# Patient Record
Sex: FEMALE | Race: WHITE | NOT HISPANIC OR LATINO | Employment: UNEMPLOYED | URBAN - NONMETROPOLITAN AREA
[De-identification: names, ages, dates, MRNs, and addresses within clinical notes are randomized per-mention and may not be internally consistent; named-entity substitution may affect disease eponyms.]

---

## 2022-02-05 ENCOUNTER — APPOINTMENT (OUTPATIENT)
Dept: GENERAL RADIOLOGY | Facility: HOSPITAL | Age: 67
End: 2022-02-05

## 2022-02-05 ENCOUNTER — HOSPITAL ENCOUNTER (EMERGENCY)
Facility: HOSPITAL | Age: 67
Discharge: HOME | End: 2022-02-05
Attending: EMERGENCY MEDICINE | Admitting: EMERGENCY MEDICINE

## 2022-02-05 ENCOUNTER — APPOINTMENT (OUTPATIENT)
Dept: CT IMAGING | Facility: HOSPITAL | Age: 67
End: 2022-02-05

## 2022-02-05 VITALS
TEMPERATURE: 99 F | HEART RATE: 79 BPM | SYSTOLIC BLOOD PRESSURE: 158 MMHG | WEIGHT: 171.8 LBS | BODY MASS INDEX: 30.44 KG/M2 | RESPIRATION RATE: 18 BRPM | DIASTOLIC BLOOD PRESSURE: 74 MMHG | HEIGHT: 63 IN | OXYGEN SATURATION: 97 %

## 2022-02-05 DIAGNOSIS — N20.0 KIDNEY STONE: ICD-10-CM

## 2022-02-05 DIAGNOSIS — N30.01 ACUTE CYSTITIS WITH HEMATURIA: Primary | ICD-10-CM

## 2022-02-05 DIAGNOSIS — K82.8 GALLBLADDER SLUDGE: ICD-10-CM

## 2022-02-05 LAB
ALBUMIN SERPL-MCNC: 4.8 G/DL (ref 3.5–5.2)
ALBUMIN/GLOB SERPL: 1.5 G/DL
ALP SERPL-CCNC: 67 U/L (ref 39–117)
ALT SERPL W P-5'-P-CCNC: 19 U/L (ref 1–33)
ANION GAP SERPL CALCULATED.3IONS-SCNC: 12.6 MMOL/L (ref 5–15)
AST SERPL-CCNC: 23 U/L (ref 1–32)
BACTERIA UR QL AUTO: ABNORMAL /HPF
BASOPHILS # BLD AUTO: 0.12 10*3/MM3 (ref 0–0.2)
BASOPHILS NFR BLD AUTO: 1.4 % (ref 0–1.5)
BILIRUB SERPL-MCNC: 0.8 MG/DL (ref 0–1.2)
BILIRUB UR QL STRIP: NEGATIVE
BUN SERPL-MCNC: 12 MG/DL (ref 8–23)
BUN/CREAT SERPL: 12.2 (ref 7–25)
CALCIUM SPEC-SCNC: 9.6 MG/DL (ref 8.6–10.5)
CHLORIDE SERPL-SCNC: 99 MMOL/L (ref 98–107)
CLARITY UR: CLEAR
CO2 SERPL-SCNC: 25.4 MMOL/L (ref 22–29)
COLOR UR: YELLOW
CREAT SERPL-MCNC: 0.98 MG/DL (ref 0.57–1)
DEPRECATED RDW RBC AUTO: 39.9 FL (ref 37–54)
EOSINOPHIL # BLD AUTO: 0.16 10*3/MM3 (ref 0–0.4)
EOSINOPHIL NFR BLD AUTO: 1.8 % (ref 0.3–6.2)
ERYTHROCYTE [DISTWIDTH] IN BLOOD BY AUTOMATED COUNT: 12.9 % (ref 12.3–15.4)
GFR SERPL CREATININE-BSD FRML MDRD: 57 ML/MIN/1.73
GFR SERPL CREATININE-BSD FRML MDRD: 69 ML/MIN/1.73
GLOBULIN UR ELPH-MCNC: 3.1 GM/DL
GLUCOSE SERPL-MCNC: 105 MG/DL (ref 65–99)
GLUCOSE UR STRIP-MCNC: NEGATIVE MG/DL
HCT VFR BLD AUTO: 46.9 % (ref 34–46.6)
HGB BLD-MCNC: 15.5 G/DL (ref 12–15.9)
HGB UR QL STRIP.AUTO: ABNORMAL
HOLD SPECIMEN: NORMAL
HOLD SPECIMEN: NORMAL
HYALINE CASTS UR QL AUTO: ABNORMAL /LPF
IMM GRANULOCYTES # BLD AUTO: 0.02 10*3/MM3 (ref 0–0.05)
IMM GRANULOCYTES NFR BLD AUTO: 0.2 % (ref 0–0.5)
KETONES UR QL STRIP: ABNORMAL
LEUKOCYTE ESTERASE UR QL STRIP.AUTO: ABNORMAL
LIPASE SERPL-CCNC: 49 U/L (ref 13–60)
LYMPHOCYTES # BLD AUTO: 3.16 10*3/MM3 (ref 0.7–3.1)
LYMPHOCYTES NFR BLD AUTO: 35.6 % (ref 19.6–45.3)
MAGNESIUM SERPL-MCNC: 2.1 MG/DL (ref 1.6–2.4)
MCH RBC QN AUTO: 27.9 PG (ref 26.6–33)
MCHC RBC AUTO-ENTMCNC: 33 G/DL (ref 31.5–35.7)
MCV RBC AUTO: 84.5 FL (ref 79–97)
MONOCYTES # BLD AUTO: 0.58 10*3/MM3 (ref 0.1–0.9)
MONOCYTES NFR BLD AUTO: 6.5 % (ref 5–12)
NEUTROPHILS NFR BLD AUTO: 4.83 10*3/MM3 (ref 1.7–7)
NEUTROPHILS NFR BLD AUTO: 54.5 % (ref 42.7–76)
NITRITE UR QL STRIP: POSITIVE
NRBC BLD AUTO-RTO: 0 /100 WBC (ref 0–0.2)
NT-PROBNP SERPL-MCNC: 156.2 PG/ML (ref 0–900)
PH UR STRIP.AUTO: 6 [PH] (ref 5–8)
PLATELET # BLD AUTO: 352 10*3/MM3 (ref 140–450)
PMV BLD AUTO: 10.4 FL (ref 6–12)
POTASSIUM SERPL-SCNC: 4 MMOL/L (ref 3.5–5.2)
PROCALCITONIN SERPL-MCNC: 0.03 NG/ML (ref 0–0.25)
PROT SERPL-MCNC: 7.9 G/DL (ref 6–8.5)
PROT UR QL STRIP: ABNORMAL
RBC # BLD AUTO: 5.55 10*6/MM3 (ref 3.77–5.28)
RBC # UR STRIP: ABNORMAL /HPF
REF LAB TEST METHOD: ABNORMAL
SODIUM SERPL-SCNC: 137 MMOL/L (ref 136–145)
SP GR UR STRIP: 1.01 (ref 1–1.03)
SQUAMOUS #/AREA URNS HPF: ABNORMAL /HPF
TROPONIN T SERPL-MCNC: <0.01 NG/ML (ref 0–0.03)
UROBILINOGEN UR QL STRIP: ABNORMAL
WBC # UR STRIP: ABNORMAL /HPF
WBC NRBC COR # BLD: 8.87 10*3/MM3 (ref 3.4–10.8)
WHOLE BLOOD HOLD SPECIMEN: NORMAL
WHOLE BLOOD HOLD SPECIMEN: NORMAL

## 2022-02-05 PROCEDURE — 71045 X-RAY EXAM CHEST 1 VIEW: CPT

## 2022-02-05 PROCEDURE — 85025 COMPLETE CBC W/AUTO DIFF WBC: CPT | Performed by: EMERGENCY MEDICINE

## 2022-02-05 PROCEDURE — 25010000002 ONDANSETRON PER 1 MG: Performed by: NURSE PRACTITIONER

## 2022-02-05 PROCEDURE — 74176 CT ABD & PELVIS W/O CONTRAST: CPT

## 2022-02-05 PROCEDURE — 87077 CULTURE AEROBIC IDENTIFY: CPT | Performed by: NURSE PRACTITIONER

## 2022-02-05 PROCEDURE — 81001 URINALYSIS AUTO W/SCOPE: CPT | Performed by: NURSE PRACTITIONER

## 2022-02-05 PROCEDURE — 96374 THER/PROPH/DIAG INJ IV PUSH: CPT

## 2022-02-05 PROCEDURE — 99284 EMERGENCY DEPT VISIT MOD MDM: CPT

## 2022-02-05 PROCEDURE — 87186 SC STD MICRODIL/AGAR DIL: CPT | Performed by: NURSE PRACTITIONER

## 2022-02-05 PROCEDURE — 83880 ASSAY OF NATRIURETIC PEPTIDE: CPT | Performed by: NURSE PRACTITIONER

## 2022-02-05 PROCEDURE — 93005 ELECTROCARDIOGRAM TRACING: CPT | Performed by: EMERGENCY MEDICINE

## 2022-02-05 PROCEDURE — 83690 ASSAY OF LIPASE: CPT | Performed by: EMERGENCY MEDICINE

## 2022-02-05 PROCEDURE — 25010000002 DEXAMETHASONE SODIUM PHOSPHATE 10 MG/ML SOLUTION: Performed by: NURSE PRACTITIONER

## 2022-02-05 PROCEDURE — 83735 ASSAY OF MAGNESIUM: CPT | Performed by: NURSE PRACTITIONER

## 2022-02-05 PROCEDURE — 93005 ELECTROCARDIOGRAM TRACING: CPT

## 2022-02-05 PROCEDURE — 87086 URINE CULTURE/COLONY COUNT: CPT | Performed by: NURSE PRACTITIONER

## 2022-02-05 PROCEDURE — 84145 PROCALCITONIN (PCT): CPT | Performed by: NURSE PRACTITIONER

## 2022-02-05 PROCEDURE — 96375 TX/PRO/DX INJ NEW DRUG ADDON: CPT

## 2022-02-05 PROCEDURE — 93005 ELECTROCARDIOGRAM TRACING: CPT | Performed by: NURSE PRACTITIONER

## 2022-02-05 PROCEDURE — 25010000002 KETOROLAC TROMETHAMINE PER 15 MG: Performed by: NURSE PRACTITIONER

## 2022-02-05 PROCEDURE — 80053 COMPREHEN METABOLIC PANEL: CPT | Performed by: EMERGENCY MEDICINE

## 2022-02-05 PROCEDURE — 84484 ASSAY OF TROPONIN QUANT: CPT | Performed by: EMERGENCY MEDICINE

## 2022-02-05 RX ORDER — NITROFURANTOIN 25; 75 MG/1; MG/1
100 CAPSULE ORAL 2 TIMES DAILY
Qty: 20 CAPSULE | Refills: 0 | Status: SHIPPED | OUTPATIENT
Start: 2022-02-05 | End: 2022-03-15

## 2022-02-05 RX ORDER — DEXAMETHASONE SODIUM PHOSPHATE 10 MG/ML
10 INJECTION, SOLUTION INTRAMUSCULAR; INTRAVENOUS ONCE
Status: DISCONTINUED | OUTPATIENT
Start: 2022-02-05 | End: 2022-02-05 | Stop reason: HOSPADM

## 2022-02-05 RX ORDER — NITROFURANTOIN 25; 75 MG/1; MG/1
100 CAPSULE ORAL 2 TIMES DAILY
Qty: 20 CAPSULE | Refills: 0 | Status: SHIPPED | OUTPATIENT
Start: 2022-02-05 | End: 2022-02-05 | Stop reason: SDUPTHER

## 2022-02-05 RX ORDER — ONDANSETRON 4 MG/1
4 TABLET, ORALLY DISINTEGRATING ORAL EVERY 8 HOURS PRN
Qty: 12 TABLET | Refills: 0 | Status: SHIPPED | OUTPATIENT
Start: 2022-02-05 | End: 2022-02-05 | Stop reason: SDUPTHER

## 2022-02-05 RX ORDER — DEXAMETHASONE SODIUM PHOSPHATE 10 MG/ML
10 INJECTION, SOLUTION INTRAMUSCULAR; INTRAVENOUS ONCE
Status: COMPLETED | OUTPATIENT
Start: 2022-02-05 | End: 2022-02-05

## 2022-02-05 RX ORDER — FAMOTIDINE 10 MG/ML
20 INJECTION, SOLUTION INTRAVENOUS ONCE
Status: DISCONTINUED | OUTPATIENT
Start: 2022-02-05 | End: 2022-02-05

## 2022-02-05 RX ORDER — SODIUM CHLORIDE 0.9 % (FLUSH) 0.9 %
10 SYRINGE (ML) INJECTION AS NEEDED
Status: DISCONTINUED | OUTPATIENT
Start: 2022-02-05 | End: 2022-02-05 | Stop reason: HOSPADM

## 2022-02-05 RX ORDER — ONDANSETRON 2 MG/ML
4 INJECTION INTRAMUSCULAR; INTRAVENOUS ONCE
Status: COMPLETED | OUTPATIENT
Start: 2022-02-05 | End: 2022-02-05

## 2022-02-05 RX ORDER — HYDROCODONE BITARTRATE AND ACETAMINOPHEN 5; 325 MG/1; MG/1
1 TABLET ORAL EVERY 6 HOURS PRN
Qty: 10 TABLET | Refills: 0 | Status: SHIPPED | OUTPATIENT
Start: 2022-02-05 | End: 2022-03-16 | Stop reason: HOSPADM

## 2022-02-05 RX ORDER — ALBUTEROL SULFATE 90 UG/1
2 AEROSOL, METERED RESPIRATORY (INHALATION) ONCE
Status: DISCONTINUED | OUTPATIENT
Start: 2022-02-05 | End: 2022-02-05

## 2022-02-05 RX ORDER — ONDANSETRON 2 MG/ML
4 INJECTION INTRAMUSCULAR; INTRAVENOUS ONCE
Status: DISCONTINUED | OUTPATIENT
Start: 2022-02-05 | End: 2022-02-05

## 2022-02-05 RX ORDER — KETOROLAC TROMETHAMINE 30 MG/ML
15 INJECTION, SOLUTION INTRAMUSCULAR; INTRAVENOUS ONCE
Status: COMPLETED | OUTPATIENT
Start: 2022-02-05 | End: 2022-02-05

## 2022-02-05 RX ORDER — KETOROLAC TROMETHAMINE 30 MG/ML
15 INJECTION, SOLUTION INTRAMUSCULAR; INTRAVENOUS ONCE
Status: DISCONTINUED | OUTPATIENT
Start: 2022-02-05 | End: 2022-02-05 | Stop reason: HOSPADM

## 2022-02-05 RX ORDER — FAMOTIDINE 10 MG/ML
20 INJECTION, SOLUTION INTRAVENOUS ONCE
Status: DISCONTINUED | OUTPATIENT
Start: 2022-02-05 | End: 2022-02-05 | Stop reason: HOSPADM

## 2022-02-05 RX ORDER — HYDROCODONE BITARTRATE AND ACETAMINOPHEN 5; 325 MG/1; MG/1
1 TABLET ORAL EVERY 6 HOURS PRN
Qty: 10 TABLET | Refills: 0 | Status: SHIPPED | OUTPATIENT
Start: 2022-02-05 | End: 2022-02-05 | Stop reason: SDUPTHER

## 2022-02-05 RX ORDER — ONDANSETRON 2 MG/ML
4 INJECTION INTRAMUSCULAR; INTRAVENOUS ONCE
Status: DISCONTINUED | OUTPATIENT
Start: 2022-02-05 | End: 2022-02-05 | Stop reason: HOSPADM

## 2022-02-05 RX ORDER — FAMOTIDINE 10 MG/ML
20 INJECTION, SOLUTION INTRAVENOUS ONCE
Status: COMPLETED | OUTPATIENT
Start: 2022-02-05 | End: 2022-02-05

## 2022-02-05 RX ORDER — ONDANSETRON 4 MG/1
4 TABLET, ORALLY DISINTEGRATING ORAL EVERY 8 HOURS PRN
Qty: 12 TABLET | Refills: 0 | Status: SHIPPED | OUTPATIENT
Start: 2022-02-05 | End: 2022-06-24

## 2022-02-05 RX ADMIN — FAMOTIDINE 20 MG: 10 INJECTION INTRAVENOUS at 14:11

## 2022-02-05 RX ADMIN — DEXAMETHASONE SODIUM PHOSPHATE 10 MG: 10 INJECTION INTRAMUSCULAR; INTRAVENOUS at 14:10

## 2022-02-05 RX ADMIN — KETOROLAC TROMETHAMINE 15 MG: 30 INJECTION, SOLUTION INTRAMUSCULAR; INTRAVENOUS at 14:11

## 2022-02-05 RX ADMIN — ONDANSETRON 4 MG: 2 INJECTION INTRAMUSCULAR; INTRAVENOUS at 14:11

## 2022-02-05 RX ADMIN — SODIUM CHLORIDE 1000 ML: 9 INJECTION, SOLUTION INTRAVENOUS at 14:11

## 2022-02-07 LAB — BACTERIA SPEC AEROBE CULT: ABNORMAL

## 2022-03-10 ENCOUNTER — OFFICE VISIT (OUTPATIENT)
Dept: UROLOGY | Facility: CLINIC | Age: 67
End: 2022-03-10

## 2022-03-10 VITALS
WEIGHT: 171 LBS | OXYGEN SATURATION: 99 % | DIASTOLIC BLOOD PRESSURE: 88 MMHG | HEIGHT: 63 IN | SYSTOLIC BLOOD PRESSURE: 158 MMHG | BODY MASS INDEX: 30.3 KG/M2 | TEMPERATURE: 97 F | HEART RATE: 70 BPM

## 2022-03-10 DIAGNOSIS — N20.0 NEPHROLITHIASIS: Primary | ICD-10-CM

## 2022-03-10 LAB
BILIRUB BLD-MCNC: NEGATIVE MG/DL
CLARITY, POC: CLEAR
COLOR UR: YELLOW
EXPIRATION DATE: ABNORMAL
GLUCOSE UR STRIP-MCNC: NEGATIVE MG/DL
KETONES UR QL: NEGATIVE
LEUKOCYTE EST, POC: NEGATIVE
Lab: ABNORMAL
NITRITE UR-MCNC: NEGATIVE MG/ML
PH UR: 7 [PH] (ref 5–8)
PROT UR STRIP-MCNC: ABNORMAL MG/DL
RBC # UR STRIP: ABNORMAL /UL
SP GR UR: 1.01 (ref 1–1.03)
UROBILINOGEN UR QL: NORMAL

## 2022-03-10 PROCEDURE — 99204 OFFICE O/P NEW MOD 45 MIN: CPT | Performed by: UROLOGY

## 2022-03-10 RX ORDER — LEVOFLOXACIN 500 MG/1
500 TABLET, FILM COATED ORAL DAILY
Qty: 14 TABLET | Refills: 0 | Status: SHIPPED | OUTPATIENT
Start: 2022-03-10 | End: 2022-03-23 | Stop reason: HOSPADM

## 2022-03-10 RX ORDER — SODIUM CHLORIDE, SODIUM LACTATE, POTASSIUM CHLORIDE, CALCIUM CHLORIDE 600; 310; 30; 20 MG/100ML; MG/100ML; MG/100ML; MG/100ML
100 INJECTION, SOLUTION INTRAVENOUS CONTINUOUS
Status: CANCELLED | OUTPATIENT
Start: 2022-03-10

## 2022-03-10 NOTE — PROGRESS NOTES
"Chief Complaint  Kidney Stone    Steven Hendrickson, DO    HPI  Ms. Plasencia is a 66 y.o. female who presents for further management of nephrolithiasis.    She presented to ER and was diagnosed with a 5mm left ureteral stone. She presents today for follow up.  She is having intermittent left flank pain.  No fever, chills, nausea, vomiting.  No dysuria.    Past Medical History  Past Medical History:   Diagnosis Date   • Melanoma (HCC)        Past Surgical History  Past Surgical History:   Procedure Laterality Date   • SKIN CANCER EXCISION         Medications    Current Outpatient Medications:   •  HYDROcodone-acetaminophen (NORCO) 5-325 MG per tablet, Take 1 tablet by mouth Every 6 (Six) Hours As Needed for Moderate Pain ., Disp: 10 tablet, Rfl: 0  •  nitrofurantoin, macrocrystal-monohydrate, (MACROBID) 100 MG capsule, Take 1 capsule by mouth 2 (Two) Times a Day., Disp: 20 capsule, Rfl: 0  •  ondansetron ODT (ZOFRAN-ODT) 4 MG disintegrating tablet, Place 1 tablet on the tongue Every 8 (Eight) Hours As Needed for Nausea or Vomiting., Disp: 12 tablet, Rfl: 0    Allergies  Allergies   Allergen Reactions   • Amoxicillin Hives   • Doxycycline Other (See Comments)     Blisters all over     • Nitrofurantoin Hives   • Penicillins Hives       Social History  Social History     Socioeconomic History   • Marital status:    Tobacco Use   • Smoking status: Never Smoker   • Smokeless tobacco: Never Used   Vaping Use   • Vaping Use: Never used   Substance and Sexual Activity   • Alcohol use: Never   • Drug use: Never   • Sexual activity: Defer       Family History  History reviewed. No pertinent family history.      Physical Exam  Visit Vitals  /88   Pulse 70   Temp 97 °F (36.1 °C)   Ht 160 cm (63\")   Wt 77.6 kg (171 lb)   SpO2 99%   BMI 30.29 kg/m²     Physical exam was notable for normal habitus.     Labs  Lab Results   Component Value Date    GLUCOSE 105 (H) 02/05/2022    BUN 12 02/05/2022    CREATININE 0.98 02/05/2022    " EGFRIFNONA 57 (L) 02/05/2022    EGFRIFAFRI 69 02/05/2022    BCR 12.2 02/05/2022    K 4.0 02/05/2022    CO2 25.4 02/05/2022    CALCIUM 9.6 02/05/2022    ALBUMIN 4.80 02/05/2022    AST 23 02/05/2022    ALT 19 02/05/2022       Lab Results   Component Value Date    WBC 8.87 02/05/2022    HGB 15.5 02/05/2022    HCT 46.9 (H) 02/05/2022    MCV 84.5 02/05/2022     02/05/2022       No results found for: LDH, URICACID    Lab Results   Component Value Date    CALCIUM 9.6 02/05/2022       Brief Urine Lab Results  (Last result in the past 365 days)      Color   Clarity   Blood   Leuk Est   Nitrite   Protein   CREAT   Urine HCG        02/17/22 0040 Yellow   Clear   LARGE   TRACE   Negative                   No results found for: URINECX    )No components found for: STONEANALYSI      Radiologic Studies  CT Abdomen Pelvis Without Contrast  Result Date: 2/5/2022  5 mm calcification in the region of the distal left ureter which may reflect a distal ureteral calcification. There is no significant hydronephrosis.     615.48 mGy.cm. 12.49 mGy  This study was performed with techniques to keep radiation doses as low as reasonably achievable (ALARA). Individualized dose reduction techniques using automated exposure control or adjustment of mA and/or kV according to the patient size were employed.  This report was finalized on 2/5/2022 2:48 PM by Emilie Montiel M.D..    XR Chest 1 View  Result Date: 2/5/2022  No acute cardiopulmonary process.    This report was finalized on 2/5/2022 1:24 PM by Emilie Montiel M.D..      I have personally reviewed these labs and images.      Assessment  Ms. Plasencia is a 66 y.o. female with 5mm left distal ureteral stone and 8mm LLP renal stone. UTI 1 mo ago she hasn't cleared    We had informed discussion about the causes of stones, in the main treatments for nephrolithiasis.  The 3 main surgical treatments for stones are percutaneous nephrolithotomy, ureteroscopy and laser lithotripsy, and  shockwave lithotripsy.  Based on patient factors and the stone size and location, I have recommended stent then URS/LL.  We discussed the risks, benefits, and alternatives to this therapy.  The main risks that we discussed were bleeding, urinary infection, damage to nearby structures, need for further procedures, and cardiopulmonary complications from anesthesia.  The patient voiced understanding and wished to proceed.     Plan  1. Schedule for Left  stent at hospital

## 2022-03-11 DIAGNOSIS — Z20.822 ENCOUNTER FOR PREOPERATIVE SCREENING LABORATORY TESTING FOR COVID-19 VIRUS: Primary | ICD-10-CM

## 2022-03-11 DIAGNOSIS — Z01.812 ENCOUNTER FOR PREOPERATIVE SCREENING LABORATORY TESTING FOR COVID-19 VIRUS: Primary | ICD-10-CM

## 2022-03-14 ENCOUNTER — TELEPHONE (OUTPATIENT)
Dept: UROLOGY | Facility: CLINIC | Age: 67
End: 2022-03-14

## 2022-03-14 LAB
BACTERIA UR CULT: ABNORMAL
OTHER ANTIBIOTIC SUSC ISLT: ABNORMAL

## 2022-03-14 NOTE — TELEPHONE ENCOUNTER
Pt is calling with concerns about the Levaquin prescribed for her..she has a lot of allergies & is scared of taking something new...asking for Cipro if possible...

## 2022-03-15 ENCOUNTER — TELEPHONE (OUTPATIENT)
Dept: UROLOGY | Facility: CLINIC | Age: 67
End: 2022-03-15

## 2022-03-15 RX ORDER — GRANULES FOR ORAL 3 G/1
3 POWDER ORAL EVERY OTHER DAY
Qty: 18 G | Refills: 0 | Status: ON HOLD | OUTPATIENT
Start: 2022-03-15 | End: 2022-03-16 | Stop reason: SDUPTHER

## 2022-03-15 NOTE — PROGRESS NOTES
Please call patient and tell her her urine grew multidrug-resistant Enterococcus, and I have sent in fosfomycin because she is allergic to Macrobid.  I sent this to her [facility].  Have her go fill it today and start taking it every other day for a week.  If they do not have it or if it is very expensive at [facility] tell her to call us and we will send it to [facility], figure out which [facility] is closest to her, and tell her to use a good Rx card.

## 2022-03-15 NOTE — PRE-PROCEDURE INSTRUCTIONS
PAT phone history completed with pt for upcoming procedure on 3/16/22 with Dr. Mccall.     PAT PASS GIVEN/REVIEWED WITH PT.  VERBALIZED UNDERSTANDING OF THE FOLLOWING:  DO NOT EAT, DRINK, SMOKE, USE SMOKELESS TOBACCO OR CHEW GUM AFTER MIDNIGHT THE NIGHT BEFORE SURGERY.  THIS ALSO INCLUDES HARD CANDIES AND MINTS.    DO NOT SHAVE THE AREA TO BE OPERATED ON AT LEAST 48 HOURS PRIOR TO THE PROCEDURE.  DO NOT WEAR MAKE UP OR NAIL POLISH.  DO NOT LEAVE IN ANY PIERCING OR WEAR JEWELRY THE DAY OF SURGERY.      DO NOT USE ADHESIVES IF YOU WEAR DENTURES.    DO NOT WEAR EYE CONTACTS; BRING IN YOUR GLASSES.    ONLY TAKE MEDICATION THE MORNING OF YOUR PROCEDURE IF INSTRUCTED BY YOUR SURGEON WITH ENOUGH WATER TO SWALLOW THE MEDICATION.  IF YOUR SURGEON DID NOT SPECIFY WHICH MEDICATIONS TO TAKE, YOU WILL NEED TO CALL THEIR OFFICE FOR FURTHER INSTRUCTIONS AND DO AS THEY INSTRUCT.    LEAVE ANYTHING YOU CONSIDER VALUABLE AT HOME.    YOU WILL NEED TO ARRANGE FOR SOMEONE TO DRIVE YOU HOME AFTER SURGERY.  IT IS RECOMMENDED THAT YOU DO NOT DRIVE, WORK, DRINK ALCOHOL OR MAKE MAJOR DECISIONS FOR AT LEAST 24 HOURS AFTER YOUR PROCEDURE IS COMPLETE.      THE DAY OF YOUR PROCEDURE, BRING IN THE FOLLOWING IF APPLICABLE:   PICTURE ID AND INSURANCE/MEDICARE OR MEDICAID CARDS/ANY CO-PAY THAT MAY BE DUE   COPY OF ADVANCED DIRECTIVE/LIVING WILL/POWER OR    CPAP/BIPAP/INHALERS   SKIN PREP SHEET   YOUR PREADMISSION TESTING PASS (IF NOT A PHONE HISTORY)

## 2022-03-16 ENCOUNTER — ANESTHESIA EVENT (OUTPATIENT)
Dept: PERIOP | Facility: HOSPITAL | Age: 67
End: 2022-03-16

## 2022-03-16 ENCOUNTER — HOSPITAL ENCOUNTER (OUTPATIENT)
Facility: HOSPITAL | Age: 67
Setting detail: HOSPITAL OUTPATIENT SURGERY
Discharge: HOME | End: 2022-03-16
Attending: UROLOGY | Admitting: UROLOGY

## 2022-03-16 ENCOUNTER — APPOINTMENT (OUTPATIENT)
Dept: GENERAL RADIOLOGY | Facility: HOSPITAL | Age: 67
End: 2022-03-16

## 2022-03-16 ENCOUNTER — ANESTHESIA (OUTPATIENT)
Dept: PERIOP | Facility: HOSPITAL | Age: 67
End: 2022-03-16

## 2022-03-16 VITALS
HEIGHT: 63 IN | BODY MASS INDEX: 30.12 KG/M2 | RESPIRATION RATE: 18 BRPM | SYSTOLIC BLOOD PRESSURE: 134 MMHG | TEMPERATURE: 98.9 F | HEART RATE: 61 BPM | WEIGHT: 170 LBS | OXYGEN SATURATION: 96 % | DIASTOLIC BLOOD PRESSURE: 63 MMHG

## 2022-03-16 DIAGNOSIS — N20.0 NEPHROLITHIASIS: Primary | ICD-10-CM

## 2022-03-16 DIAGNOSIS — Z20.822 ENCOUNTER FOR PREOPERATIVE SCREENING LABORATORY TESTING FOR COVID-19 VIRUS: Primary | ICD-10-CM

## 2022-03-16 DIAGNOSIS — Z01.812 ENCOUNTER FOR PREOPERATIVE SCREENING LABORATORY TESTING FOR COVID-19 VIRUS: Primary | ICD-10-CM

## 2022-03-16 DIAGNOSIS — N20.0 NEPHROLITHIASIS: ICD-10-CM

## 2022-03-16 PROCEDURE — 25010000002 LEVOFLOXACIN PER 250 MG: Performed by: UROLOGY

## 2022-03-16 PROCEDURE — 52332 CYSTOSCOPY AND TREATMENT: CPT | Performed by: UROLOGY

## 2022-03-16 PROCEDURE — 25010000002 FENTANYL CITRATE (PF) 50 MCG/ML SOLUTION: Performed by: NURSE ANESTHETIST, CERTIFIED REGISTERED

## 2022-03-16 PROCEDURE — C1758 CATHETER, URETERAL: HCPCS | Performed by: UROLOGY

## 2022-03-16 PROCEDURE — C1769 GUIDE WIRE: HCPCS | Performed by: UROLOGY

## 2022-03-16 PROCEDURE — 52351 CYSTOURETERO & OR PYELOSCOPE: CPT | Performed by: UROLOGY

## 2022-03-16 PROCEDURE — 25010000002 MIDAZOLAM PER 1MG: Performed by: NURSE ANESTHETIST, CERTIFIED REGISTERED

## 2022-03-16 PROCEDURE — C2617 STENT, NON-COR, TEM W/O DEL: HCPCS | Performed by: UROLOGY

## 2022-03-16 PROCEDURE — 25010000002 ONDANSETRON PER 1 MG: Performed by: NURSE ANESTHETIST, CERTIFIED REGISTERED

## 2022-03-16 PROCEDURE — 25010000002 PROPOFOL 10 MG/ML EMULSION: Performed by: NURSE ANESTHETIST, CERTIFIED REGISTERED

## 2022-03-16 DEVICE — URETERAL STENT
Type: IMPLANTABLE DEVICE | Site: URETER | Status: FUNCTIONAL
Brand: CONTOUR™

## 2022-03-16 RX ORDER — OXYCODONE HYDROCHLORIDE 5 MG/1
5 TABLET ORAL EVERY 6 HOURS PRN
Qty: 5 TABLET | Refills: 0 | Status: ON HOLD | OUTPATIENT
Start: 2022-03-16 | End: 2022-03-23 | Stop reason: SDUPTHER

## 2022-03-16 RX ORDER — MIDAZOLAM HYDROCHLORIDE 2 MG/2ML
INJECTION, SOLUTION INTRAMUSCULAR; INTRAVENOUS AS NEEDED
Status: DISCONTINUED | OUTPATIENT
Start: 2022-03-16 | End: 2022-03-16 | Stop reason: SURG

## 2022-03-16 RX ORDER — ATROPA BELLADONNA AND OPIUM 16.2; 3 MG/1; MG/1
SUPPOSITORY RECTAL AS NEEDED
Status: DISCONTINUED | OUTPATIENT
Start: 2022-03-16 | End: 2022-03-16 | Stop reason: HOSPADM

## 2022-03-16 RX ORDER — SODIUM CHLORIDE 9 MG/ML
100 INJECTION, SOLUTION INTRAVENOUS CONTINUOUS
Status: CANCELLED | OUTPATIENT
Start: 2022-03-16

## 2022-03-16 RX ORDER — PHENAZOPYRIDINE HYDROCHLORIDE 100 MG/1
100 TABLET, FILM COATED ORAL 3 TIMES DAILY PRN
Qty: 21 TABLET | Refills: 0 | Status: ON HOLD | OUTPATIENT
Start: 2022-03-16 | End: 2022-03-23 | Stop reason: SDUPTHER

## 2022-03-16 RX ORDER — DOCUSATE SODIUM 100 MG/1
100 CAPSULE, LIQUID FILLED ORAL 2 TIMES DAILY
Qty: 15 CAPSULE | Refills: 1 | Status: SHIPPED | OUTPATIENT
Start: 2022-03-16 | End: 2022-03-23 | Stop reason: HOSPADM

## 2022-03-16 RX ORDER — PROPOFOL 10 MG/ML
VIAL (ML) INTRAVENOUS AS NEEDED
Status: DISCONTINUED | OUTPATIENT
Start: 2022-03-16 | End: 2022-03-16 | Stop reason: SURG

## 2022-03-16 RX ORDER — ONDANSETRON 2 MG/ML
INJECTION INTRAMUSCULAR; INTRAVENOUS AS NEEDED
Status: DISCONTINUED | OUTPATIENT
Start: 2022-03-16 | End: 2022-03-16 | Stop reason: SURG

## 2022-03-16 RX ORDER — ONDANSETRON 2 MG/ML
4 INJECTION INTRAMUSCULAR; INTRAVENOUS ONCE AS NEEDED
Status: DISCONTINUED | OUTPATIENT
Start: 2022-03-16 | End: 2022-03-16 | Stop reason: HOSPADM

## 2022-03-16 RX ORDER — LIDOCAINE HYDROCHLORIDE 20 MG/ML
INJECTION, SOLUTION INTRAVENOUS AS NEEDED
Status: DISCONTINUED | OUTPATIENT
Start: 2022-03-16 | End: 2022-03-16 | Stop reason: SURG

## 2022-03-16 RX ORDER — IPRATROPIUM BROMIDE AND ALBUTEROL SULFATE 2.5; .5 MG/3ML; MG/3ML
3 SOLUTION RESPIRATORY (INHALATION) ONCE
Status: DISCONTINUED | OUTPATIENT
Start: 2022-03-16 | End: 2022-03-16

## 2022-03-16 RX ORDER — SODIUM CHLORIDE, SODIUM LACTATE, POTASSIUM CHLORIDE, CALCIUM CHLORIDE 600; 310; 30; 20 MG/100ML; MG/100ML; MG/100ML; MG/100ML
100 INJECTION, SOLUTION INTRAVENOUS CONTINUOUS
Status: DISCONTINUED | OUTPATIENT
Start: 2022-03-16 | End: 2022-03-16 | Stop reason: HOSPADM

## 2022-03-16 RX ORDER — OXYBUTYNIN CHLORIDE 10 MG/1
10 TABLET, EXTENDED RELEASE ORAL DAILY PRN
Qty: 10 TABLET | Refills: 0 | Status: ON HOLD | OUTPATIENT
Start: 2022-03-16 | End: 2022-03-23 | Stop reason: SDUPTHER

## 2022-03-16 RX ORDER — TAMSULOSIN HYDROCHLORIDE 0.4 MG/1
1 CAPSULE ORAL NIGHTLY
Qty: 10 CAPSULE | Refills: 0 | Status: ON HOLD | OUTPATIENT
Start: 2022-03-16 | End: 2022-03-23 | Stop reason: SDUPTHER

## 2022-03-16 RX ORDER — LEVOFLOXACIN 5 MG/ML
500 INJECTION, SOLUTION INTRAVENOUS ONCE
Status: COMPLETED | OUTPATIENT
Start: 2022-03-16 | End: 2022-03-16

## 2022-03-16 RX ORDER — SODIUM CHLORIDE, SODIUM LACTATE, POTASSIUM CHLORIDE, CALCIUM CHLORIDE 600; 310; 30; 20 MG/100ML; MG/100ML; MG/100ML; MG/100ML
1000 INJECTION, SOLUTION INTRAVENOUS CONTINUOUS
Status: DISCONTINUED | OUTPATIENT
Start: 2022-03-16 | End: 2022-03-16 | Stop reason: HOSPADM

## 2022-03-16 RX ORDER — FENTANYL CITRATE 50 UG/ML
INJECTION, SOLUTION INTRAMUSCULAR; INTRAVENOUS AS NEEDED
Status: DISCONTINUED | OUTPATIENT
Start: 2022-03-16 | End: 2022-03-16 | Stop reason: SURG

## 2022-03-16 RX ORDER — GRANULES FOR ORAL 3 G/1
3 POWDER ORAL EVERY OTHER DAY
Qty: 18 G | Refills: 0 | Status: ON HOLD | OUTPATIENT
Start: 2022-03-16 | End: 2022-03-23 | Stop reason: SDUPTHER

## 2022-03-16 RX ORDER — ACETAMINOPHEN 500 MG
1000 TABLET ORAL EVERY 6 HOURS
Qty: 30 TABLET | Refills: 0 | Status: SHIPPED | OUTPATIENT
Start: 2022-03-16 | End: 2022-03-20

## 2022-03-16 RX ADMIN — MIDAZOLAM HYDROCHLORIDE 2 MG: 1 INJECTION, SOLUTION INTRAMUSCULAR; INTRAVENOUS at 12:43

## 2022-03-16 RX ADMIN — PROPOFOL 100 MG: 10 INJECTION, EMULSION INTRAVENOUS at 12:43

## 2022-03-16 RX ADMIN — ONDANSETRON 4 MG: 2 INJECTION INTRAMUSCULAR; INTRAVENOUS at 13:08

## 2022-03-16 RX ADMIN — LEVOFLOXACIN 500 MG: 5 INJECTION, SOLUTION INTRAVENOUS at 12:38

## 2022-03-16 RX ADMIN — LIDOCAINE HYDROCHLORIDE 40 MG: 20 INJECTION, SOLUTION INTRAVENOUS at 12:43

## 2022-03-16 RX ADMIN — FENTANYL CITRATE 50 MCG: 50 INJECTION INTRAMUSCULAR; INTRAVENOUS at 12:43

## 2022-03-16 RX ADMIN — FENTANYL CITRATE 25 MCG: 50 INJECTION INTRAMUSCULAR; INTRAVENOUS at 12:59

## 2022-03-16 RX ADMIN — LEVOFLOXACIN 500 MG: 5 INJECTION, SOLUTION INTRAVENOUS at 12:07

## 2022-03-16 RX ADMIN — FENTANYL CITRATE 25 MCG: 50 INJECTION INTRAMUSCULAR; INTRAVENOUS at 13:04

## 2022-03-16 RX ADMIN — SODIUM CHLORIDE, POTASSIUM CHLORIDE, SODIUM LACTATE AND CALCIUM CHLORIDE 100 ML/HR: 600; 310; 30; 20 INJECTION, SOLUTION INTRAVENOUS at 12:07

## 2022-03-16 NOTE — ANESTHESIA PREPROCEDURE EVALUATION
Anesthesia Evaluation     Patient summary reviewed and Nursing notes reviewed   no history of anesthetic complications:  NPO Solid Status: > 8 hours  NPO Liquid Status: > 8 hours           Airway   Mallampati: I  TM distance: >3 FB  Neck ROM: full  no difficulty expected  Dental - normal exam     Pulmonary - negative pulmonary ROS and normal exam   Cardiovascular - normal exam    (+) DVT,       Neuro/Psych- negative ROS  GI/Hepatic/Renal/Endo    (+)  GERD,  renal disease,     Musculoskeletal (-) negative ROS    Abdominal    Substance History - negative use     OB/GYN negative ob/gyn ROS         Other - negative ROS                       Anesthesia Plan    ASA 2     general     intravenous induction     Anesthetic plan, all risks, benefits, and alternatives have been provided, discussed and informed consent has been obtained with: patient.        CODE STATUS:

## 2022-03-16 NOTE — ANESTHESIA POSTPROCEDURE EVALUATION
Patient: Lorrie Plasencia    Procedure Summary     Date: 03/16/22 Room / Location: [facility] /  [place] [facility]    Anesthesia Start: 1238 Anesthesia Stop: 1311    Procedure: CYSTOSCOPY RETROGRADE PYELOGRAM AND STENT INSERTION (Left ) Diagnosis:       Nephrolithiasis      (Nephrolithiasis [N20.0])    Surgeons: Jeremy Mccall MD Provider: Neymar Lopez CRNA    Anesthesia Type: general ASA Status: 2          Anesthesia Type: general    Vitals  No vitals data found for the desired time range.          Post Anesthesia Care and Evaluation    Patient location during evaluation: PHASE II  Patient participation: complete - patient participated  Level of consciousness: awake  Pain score: 0  Pain management: adequate  Airway patency: patent  Anesthetic complications: No anesthetic complications  PONV Status: none  Cardiovascular status: acceptable  Respiratory status: acceptable  Hydration status: acceptable  No anesthesia care post op

## 2022-03-16 NOTE — NURSING NOTE
Because of multiple allergies I stayed in the room with the patient while the Levaquin 500mg infused. I stayed 15minutes. No s\s of distress of reaction.

## 2022-03-16 NOTE — DISCHARGE INSTRUCTIONS
Home Care After Stent Placement  The following instructions will help you care for yourself, or be cared for upon your return home today.    These are guidelines for your care right after surgery only.     Diet  Drink plenty of liquids and eat light meals today.    Start your regular diet tomorrow.    Activity  Start normal activities in twenty-four (24) hours.    Wound Care and Hygiene  No restrictions, start normal routine.    Anesthesia Precautions & Expectations  After anesthesia, rest for 24 hours.    Do not drive, drink alcoholic beverages or make any important decisions during this time.  General anesthesia may cause a sore throat, jaw discomfort or muscle aches.    These symptoms can last for one or two days.     What to Expect after Surgery  Mild pain with voiding.  Frequency or urgency.  Bladder cramps.  Minimal bleeding with voiding.    Call your Doctor  Passing clots in urine preventing bladder emptying  Severe pain not controlled by oral medication  Temperature above 101.5 degrees  Inability to urinate within eight (8) hours after surgery    After Stent Placement  It is common to have blood tinged urine for 3-5 days.  It is common to have pain in your side and in your back when you urinate for 3-5 days.  It is common to have urgency with urination.  This is a temporary stent and will need to be exchanged at surgery next week    Other Contacts  Urology Office:  [address] [id number]   [place] [address]  [phone] office  [phone] fax      Follow up Appointment  You have a follow up surgery scheduled with Dr. Mccall next wednesday

## 2022-03-16 NOTE — OP NOTE
Preoperative diagnosis  Left nephrolithiasis  Left hydronephrosis    Postoperative diagnosis  Same    Procedure performed  1.  Flexible cystoscopy  2.  Left retrograde pyelogram  3.  Left ureteral stent placement, 6 x 24 Gibraltarian  4.   Fluoroscopy time < 1 hour    Attending surgeon  Jeremy Mccall MD    Anesthesia  General    Complications  None    Ebl  minimal    Findings  Cystoscopy revealed no tumors, stones or other mucosal abnormalities.  Retrograde pyelogram revealed a left dilated system.    Indications  66 y.o. female agreed to undergo the above named procedure after discussion of the alternatives, risks and benefits.  Informed consent was obtained.      Procedure  The patient was taken to the operating room and placed supine on the operating table.  Pre-operative antibiotics were administered.  Bilateral lower extremity SCDs were placed.  After induction of general anesthesia the patient was positioned in dorsal lithotomy, prepped and draped in a sterile fashion.  A time-out was performed.      A 14 Gibraltarian flexible cystoscope was passed carefully via urethra into the bladder.  A Sensor wire was passed retrograde through the left ureteral orifice.  A 5-Gibraltarian open-ended catheter was passed over the wire.  A retrograde pyelogram was performed by slowly injecting 5mL of Omnipaque contrast via the 5 Gibraltarian catheter with findings described above. A superstiff wire was then placed into the upper pole and a 6 x 24 Gibraltarian ureteral stent was placed in retrograde fashion under fluoroscopic guidance with good coil confirmed to be in the upper pole of the kidney and in the bladder. The patient tolerated the procedure well.     She will follow up with me next week for definitive surgery of her stone.

## 2022-03-16 NOTE — PROGRESS NOTES
Pt has oxygen saturation of 86% on room air. Her oxygen saturation drops to low 80's when she is OOB and walking. Pt placed back on oxygen, chest x-ray ordered, Dr. Mccall notified, pulmonary toilet continued and breathing treatment ordered. Pt updated on her status. Will continue to monitor as patient does not meet discharge criteria at this time. Pt reports history of low oxygen levels at home prior to procedure. However, no low oxygen saturations were noted pre-operatively in the chart.

## 2022-03-16 NOTE — H&P
BENITO Plasencia is a 66 y.o. with history of   1. Nephrolithiasis         No recent fevers or new LUTS  Does not take any blood thinners    Past Medical History  Past Medical History:   Diagnosis Date   • DVT (deep venous thrombosis) (HCC)     left leg   • GERD (gastroesophageal reflux disease)    • History of transfusion     No reaction   • Kidney stones    • Melanoma (HCC)        Past Surgical History  Past Surgical History:   Procedure Laterality Date   • COLONOSCOPY     • SKIN CANCER EXCISION     • TUBAL ABDOMINAL LIGATION     • URETERAL STENT INSERTION     • WISDOM TOOTH EXTRACTION         Medications    Current Facility-Administered Medications:   •  lactated ringers infusion 1,000 mL, 1,000 mL, Intravenous, Continuous, Jeremy Mccall MD  •  lactated ringers infusion, 100 mL/hr, Intravenous, Continuous, Jeremy Mccall MD  •  levoFLOXacin (LEVAQUIN) 500 mg/100 mL D5W (premix) (LEVAQUIN) 500 mg, 500 mg, Intravenous, Once, Jeremy Mccall MD    Allergies  Allergies   Allergen Reactions   • Amoxicillin Hives   • Doxycycline Other (See Comments)     Blisters all over     • Nitrofurantoin Hives   • Penicillins Hives   • Sulfamethazine Rash       Social History  Social History     Socioeconomic History   • Marital status:    Tobacco Use   • Smoking status: Never Smoker   • Smokeless tobacco: Never Used   Vaping Use   • Vaping Use: Never used   Substance and Sexual Activity   • Alcohol use: Never   • Drug use: Never   • Sexual activity: Defer       Review of Systems  Constitutional: No fevers or chills  Skin: Negative for rash  Endocrine: No heat/cold intolerance   Cardiovascular: Negative for chest pain or dyspnea on exertion  Respiratory: Negative for shortness of breath or wheezing  Gastrointestinal: No constipation, nausea or vomiting  Genitourinary: Negative for new lower urinary tract symptoms, current gross hematuria or dysuria.  Musculoskeletal: No flank pain  Neurological:   "Negative for frequent headaches or dizziness  Lymph/Heme: Negative for leg swelling or calf pain.    Physical Exam  Visit Vitals  /78 (BP Location: Right arm, Patient Position: Sitting)   Pulse 66   Temp 97.4 °F (36.3 °C) (Temporal)   Resp 18   Ht 160 cm (63\")   Wt 77.1 kg (170 lb)   SpO2 97%   BMI 30.11 kg/m²     Constitutional: NAD, WDWN.   HEENT: NCAT. Conjunctivae normal.  MMM.    Cardiovascular: Regular rate.  Pulmonary/Chest: Respirations are even and non-labored bilaterally.  Abdominal: Soft. No distension, tenderness, masses or guarding. No CVA tenderness.  Neurological: A + O x 3.  Cranial Nerves II-XII grossly intact. Normal gait.  Extremities: MAIKOL x 4, Warm. No clubbing.  No cyanosis.    Skin: Pink, warm and dry.  No rashes noted.  Psychiatric:  Normal mood and affect    Labs & Imaging  Lab Results   Component Value Date    GLUCOSE 105 (H) 02/05/2022    CALCIUM 9.6 02/05/2022     02/05/2022    K 4.0 02/05/2022    CO2 25.4 02/05/2022    CL 99 02/05/2022    BUN 12 02/05/2022    CREATININE 0.98 02/05/2022    EGFRIFAFRI 69 02/05/2022    EGFRIFNONA 57 (L) 02/05/2022    BCR 12.2 02/05/2022    ANIONGAP 12.6 02/05/2022     Lab Results   Component Value Date    WBC 8.87 02/05/2022    HGB 15.5 02/05/2022    HCT 46.9 (H) 02/05/2022    MCV 84.5 02/05/2022     02/05/2022     Brief Urine Lab Results  (Last result in the past 365 days)      Color   Clarity   Blood   Leuk Est   Nitrite   Protein   CREAT   Urine HCG        03/10/22 1016 Yellow   Clear   2+   Negative   Negative   1+               Urine Culture    Urine Culture 2/5/22 3/10/22   Urine Culture >100,000 CFU/mL Klebsiella pneumoniae ssp pneumoniae (A) Final report (A)   (A) Abnormal value               No results found.        Assessment  Lorrie Plasencia is a 66 y.o. female who presents with the following diagnosis:  1. Nephrolithiasis         Plan  1. To OR for CYSTOSCOPY RETROGRADE PYELOGRAM AND STENT INSERTION LEFT    Jeremy Mccall, " MD

## 2022-03-22 ENCOUNTER — PRE-ADMISSION TESTING (OUTPATIENT)
Dept: PREADMISSION TESTING | Facility: HOSPITAL | Age: 67
End: 2022-03-22

## 2022-03-22 VITALS — HEIGHT: 63 IN | BODY MASS INDEX: 29.95 KG/M2 | WEIGHT: 169 LBS

## 2022-03-22 DIAGNOSIS — Z20.822 ENCOUNTER FOR PREOPERATIVE SCREENING LABORATORY TESTING FOR COVID-19 VIRUS: ICD-10-CM

## 2022-03-22 DIAGNOSIS — Z01.812 ENCOUNTER FOR PREOPERATIVE SCREENING LABORATORY TESTING FOR COVID-19 VIRUS: ICD-10-CM

## 2022-03-22 LAB
DEPRECATED RDW RBC AUTO: 39.8 FL (ref 37–54)
ERYTHROCYTE [DISTWIDTH] IN BLOOD BY AUTOMATED COUNT: 13 % (ref 12.3–15.4)
HCT VFR BLD AUTO: 42.1 % (ref 34–46.6)
HGB BLD-MCNC: 14 G/DL (ref 12–15.9)
MCH RBC QN AUTO: 28.2 PG (ref 26.6–33)
MCHC RBC AUTO-ENTMCNC: 33.3 G/DL (ref 31.5–35.7)
MCV RBC AUTO: 84.7 FL (ref 79–97)
PLATELET # BLD AUTO: 355 10*3/MM3 (ref 140–450)
PMV BLD AUTO: 10.4 FL (ref 6–12)
RBC # BLD AUTO: 4.97 10*6/MM3 (ref 3.77–5.28)
SARS-COV-2 RNA PNL SPEC NAA+PROBE: NOT DETECTED
WBC NRBC COR # BLD: 9.56 10*3/MM3 (ref 3.4–10.8)

## 2022-03-22 PROCEDURE — C9803 HOPD COVID-19 SPEC COLLECT: HCPCS

## 2022-03-22 PROCEDURE — 85027 COMPLETE CBC AUTOMATED: CPT

## 2022-03-22 PROCEDURE — 36415 COLL VENOUS BLD VENIPUNCTURE: CPT

## 2022-03-22 PROCEDURE — 87635 SARS-COV-2 COVID-19 AMP PRB: CPT

## 2022-03-22 RX ORDER — ERGOCALCIFEROL 1.25 MG/1
50000 CAPSULE ORAL
COMMUNITY
End: 2023-03-28

## 2022-03-22 NOTE — PAT
"Called anesthesia phone and spoke with Hector Harden CRNA.  Informed that pt is to have a procedure tomorrow with Dr. Mccall.  Reviewed EKG done in the E.R. from 2/5/22 with CRNA, along with the E.R provider's interpretation from the E.R. note:  \"EKG interpreted by me reveals sinus rhythm rate 77.  No ectopy no ischemic changes.\"  Informed that pt had a procedure on 3/16/22 with Dr. Mccall as well.  Pt states that the pt is \"good to go\".  No orders received.   "

## 2022-03-23 ENCOUNTER — HOSPITAL ENCOUNTER (OUTPATIENT)
Facility: HOSPITAL | Age: 67
Setting detail: HOSPITAL OUTPATIENT SURGERY
Discharge: HOME | End: 2022-03-23
Attending: UROLOGY | Admitting: UROLOGY

## 2022-03-23 ENCOUNTER — ANESTHESIA (OUTPATIENT)
Dept: PERIOP | Facility: HOSPITAL | Age: 67
End: 2022-03-23

## 2022-03-23 ENCOUNTER — ANESTHESIA EVENT (OUTPATIENT)
Dept: PERIOP | Facility: HOSPITAL | Age: 67
End: 2022-03-23

## 2022-03-23 VITALS
SYSTOLIC BLOOD PRESSURE: 154 MMHG | RESPIRATION RATE: 20 BRPM | DIASTOLIC BLOOD PRESSURE: 62 MMHG | HEART RATE: 73 BPM | TEMPERATURE: 98 F | OXYGEN SATURATION: 95 %

## 2022-03-23 DIAGNOSIS — N20.0 NEPHROLITHIASIS: ICD-10-CM

## 2022-03-23 PROCEDURE — 25010000002 ONDANSETRON PER 1 MG: Performed by: NURSE ANESTHETIST, CERTIFIED REGISTERED

## 2022-03-23 PROCEDURE — C1769 GUIDE WIRE: HCPCS | Performed by: UROLOGY

## 2022-03-23 PROCEDURE — 25010000002 FENTANYL CITRATE (PF) 100 MCG/2ML SOLUTION: Performed by: NURSE ANESTHETIST, CERTIFIED REGISTERED

## 2022-03-23 PROCEDURE — C2617 STENT, NON-COR, TEM W/O DEL: HCPCS | Performed by: UROLOGY

## 2022-03-23 PROCEDURE — 25010000002 KETOROLAC TROMETHAMINE PER 15 MG: Performed by: NURSE ANESTHETIST, CERTIFIED REGISTERED

## 2022-03-23 PROCEDURE — C1894 INTRO/SHEATH, NON-LASER: HCPCS | Performed by: UROLOGY

## 2022-03-23 PROCEDURE — 25010000002 DEXAMETHASONE PER 1 MG: Performed by: NURSE ANESTHETIST, CERTIFIED REGISTERED

## 2022-03-23 PROCEDURE — 25010000002 IOPAMIDOL 61 % SOLUTION: Performed by: UROLOGY

## 2022-03-23 PROCEDURE — 25010000002 PROPOFOL 200 MG/20ML EMULSION: Performed by: NURSE ANESTHETIST, CERTIFIED REGISTERED

## 2022-03-23 PROCEDURE — 52356 CYSTO/URETERO W/LITHOTRIPSY: CPT | Performed by: UROLOGY

## 2022-03-23 PROCEDURE — 25010000002 LEVOFLOXACIN PER 250 MG: Performed by: UROLOGY

## 2022-03-23 DEVICE — URETERAL STENT
Type: IMPLANTABLE DEVICE | Site: URETER | Status: FUNCTIONAL
Brand: CONTOUR™

## 2022-03-23 RX ORDER — GRANULES FOR ORAL 3 G/1
3 POWDER ORAL EVERY OTHER DAY
Qty: 18 G | Refills: 0 | Status: SHIPPED | OUTPATIENT
Start: 2022-03-23 | End: 2022-04-03

## 2022-03-23 RX ORDER — FENTANYL CITRATE 50 UG/ML
INJECTION, SOLUTION INTRAMUSCULAR; INTRAVENOUS AS NEEDED
Status: DISCONTINUED | OUTPATIENT
Start: 2022-03-23 | End: 2022-03-23 | Stop reason: SURG

## 2022-03-23 RX ORDER — ACETAMINOPHEN 500 MG
1000 TABLET ORAL EVERY 6 HOURS
Qty: 30 TABLET | Refills: 0 | Status: SHIPPED | OUTPATIENT
Start: 2022-03-23 | End: 2022-03-27

## 2022-03-23 RX ORDER — CEFAZOLIN SODIUM 2 G/50ML
2 SOLUTION INTRAVENOUS ONCE
Status: DISCONTINUED | OUTPATIENT
Start: 2022-03-23 | End: 2022-03-23

## 2022-03-23 RX ORDER — PROMETHAZINE HYDROCHLORIDE 25 MG/1
25 SUPPOSITORY RECTAL ONCE AS NEEDED
Status: DISCONTINUED | OUTPATIENT
Start: 2022-03-23 | End: 2022-03-23 | Stop reason: HOSPADM

## 2022-03-23 RX ORDER — MAGNESIUM HYDROXIDE 1200 MG/15ML
LIQUID ORAL AS NEEDED
Status: DISCONTINUED | OUTPATIENT
Start: 2022-03-23 | End: 2022-03-23 | Stop reason: HOSPADM

## 2022-03-23 RX ORDER — ONDANSETRON 2 MG/ML
INJECTION INTRAMUSCULAR; INTRAVENOUS AS NEEDED
Status: DISCONTINUED | OUTPATIENT
Start: 2022-03-23 | End: 2022-03-23 | Stop reason: SURG

## 2022-03-23 RX ORDER — OXYCODONE HYDROCHLORIDE 5 MG/1
5 TABLET ORAL EVERY 6 HOURS PRN
Qty: 5 TABLET | Refills: 0 | Status: SHIPPED | OUTPATIENT
Start: 2022-03-23 | End: 2022-06-24

## 2022-03-23 RX ORDER — DOCUSATE SODIUM 100 MG/1
100 CAPSULE, LIQUID FILLED ORAL 2 TIMES DAILY
Qty: 15 CAPSULE | Refills: 1 | Status: SHIPPED | OUTPATIENT
Start: 2022-03-23 | End: 2022-06-24

## 2022-03-23 RX ORDER — ONDANSETRON 2 MG/ML
4 INJECTION INTRAMUSCULAR; INTRAVENOUS ONCE AS NEEDED
Status: DISCONTINUED | OUTPATIENT
Start: 2022-03-23 | End: 2022-03-23 | Stop reason: HOSPADM

## 2022-03-23 RX ORDER — KETOROLAC TROMETHAMINE 30 MG/ML
INJECTION, SOLUTION INTRAMUSCULAR; INTRAVENOUS AS NEEDED
Status: DISCONTINUED | OUTPATIENT
Start: 2022-03-23 | End: 2022-03-23 | Stop reason: SURG

## 2022-03-23 RX ORDER — OXYBUTYNIN CHLORIDE 10 MG/1
10 TABLET, EXTENDED RELEASE ORAL DAILY PRN
Qty: 10 TABLET | Refills: 0 | Status: SHIPPED | OUTPATIENT
Start: 2022-03-23 | End: 2022-06-24

## 2022-03-23 RX ORDER — IPRATROPIUM BROMIDE AND ALBUTEROL SULFATE 2.5; .5 MG/3ML; MG/3ML
3 SOLUTION RESPIRATORY (INHALATION) ONCE AS NEEDED
Status: DISCONTINUED | OUTPATIENT
Start: 2022-03-23 | End: 2022-03-23 | Stop reason: HOSPADM

## 2022-03-23 RX ORDER — SODIUM CHLORIDE 9 MG/ML
100 INJECTION, SOLUTION INTRAVENOUS CONTINUOUS
Status: DISCONTINUED | OUTPATIENT
Start: 2022-03-23 | End: 2022-03-23 | Stop reason: HOSPADM

## 2022-03-23 RX ORDER — LEVOFLOXACIN 5 MG/ML
500 INJECTION, SOLUTION INTRAVENOUS EVERY 24 HOURS
Status: COMPLETED | OUTPATIENT
Start: 2022-03-23 | End: 2022-03-23

## 2022-03-23 RX ORDER — DEXAMETHASONE SODIUM PHOSPHATE 4 MG/ML
INJECTION, SOLUTION INTRA-ARTICULAR; INTRALESIONAL; INTRAMUSCULAR; INTRAVENOUS; SOFT TISSUE AS NEEDED
Status: DISCONTINUED | OUTPATIENT
Start: 2022-03-23 | End: 2022-03-23 | Stop reason: SURG

## 2022-03-23 RX ORDER — TAMSULOSIN HYDROCHLORIDE 0.4 MG/1
1 CAPSULE ORAL NIGHTLY
Qty: 10 CAPSULE | Refills: 0 | Status: SHIPPED | OUTPATIENT
Start: 2022-03-23 | End: 2022-06-24

## 2022-03-23 RX ORDER — PHENAZOPYRIDINE HYDROCHLORIDE 100 MG/1
100 TABLET, FILM COATED ORAL 3 TIMES DAILY PRN
Qty: 21 TABLET | Refills: 0 | Status: SHIPPED | OUTPATIENT
Start: 2022-03-23 | End: 2022-06-24

## 2022-03-23 RX ORDER — ATROPA BELLADONNA AND OPIUM 16.2; 3 MG/1; MG/1
SUPPOSITORY RECTAL AS NEEDED
Status: DISCONTINUED | OUTPATIENT
Start: 2022-03-23 | End: 2022-03-23 | Stop reason: HOSPADM

## 2022-03-23 RX ORDER — MEPERIDINE HYDROCHLORIDE 25 MG/ML
12.5 INJECTION INTRAMUSCULAR; INTRAVENOUS; SUBCUTANEOUS
Status: DISCONTINUED | OUTPATIENT
Start: 2022-03-23 | End: 2022-03-23 | Stop reason: HOSPADM

## 2022-03-23 RX ORDER — PROMETHAZINE HYDROCHLORIDE 25 MG/1
25 TABLET ORAL ONCE AS NEEDED
Status: DISCONTINUED | OUTPATIENT
Start: 2022-03-23 | End: 2022-03-23 | Stop reason: HOSPADM

## 2022-03-23 RX ORDER — SODIUM CHLORIDE 0.9 % (FLUSH) 0.9 %
10 SYRINGE (ML) INJECTION AS NEEDED
Status: DISCONTINUED | OUTPATIENT
Start: 2022-03-23 | End: 2022-03-23 | Stop reason: HOSPADM

## 2022-03-23 RX ORDER — LIDOCAINE HYDROCHLORIDE 20 MG/ML
INJECTION, SOLUTION INTRAVENOUS AS NEEDED
Status: DISCONTINUED | OUTPATIENT
Start: 2022-03-23 | End: 2022-03-23 | Stop reason: SURG

## 2022-03-23 RX ORDER — PROPOFOL 10 MG/ML
INJECTION, EMULSION INTRAVENOUS AS NEEDED
Status: DISCONTINUED | OUTPATIENT
Start: 2022-03-23 | End: 2022-03-23 | Stop reason: SURG

## 2022-03-23 RX ADMIN — SODIUM CHLORIDE 100 ML/HR: 9 INJECTION, SOLUTION INTRAVENOUS at 11:11

## 2022-03-23 RX ADMIN — ONDANSETRON 4 MG: 2 INJECTION INTRAMUSCULAR; INTRAVENOUS at 13:17

## 2022-03-23 RX ADMIN — LIDOCAINE HYDROCHLORIDE 60 MG: 20 INJECTION, SOLUTION INTRAVENOUS at 13:13

## 2022-03-23 RX ADMIN — SODIUM CHLORIDE: 9 INJECTION, SOLUTION INTRAVENOUS at 13:57

## 2022-03-23 RX ADMIN — PROPOFOL 200 MG: 10 INJECTION, EMULSION INTRAVENOUS at 13:13

## 2022-03-23 RX ADMIN — KETOROLAC TROMETHAMINE 15 MG: 30 INJECTION, SOLUTION INTRAMUSCULAR at 14:11

## 2022-03-23 RX ADMIN — DEXAMETHASONE SODIUM PHOSPHATE 4 MG: 4 INJECTION, SOLUTION INTRAMUSCULAR; INTRAVENOUS at 13:17

## 2022-03-23 RX ADMIN — FENTANYL CITRATE 50 MCG: 50 INJECTION INTRAMUSCULAR; INTRAVENOUS at 13:17

## 2022-03-23 RX ADMIN — FENTANYL CITRATE 50 MCG: 50 INJECTION INTRAMUSCULAR; INTRAVENOUS at 13:10

## 2022-03-23 RX ADMIN — LEVOFLOXACIN 500 MG: 5 INJECTION, SOLUTION INTRAVENOUS at 13:16

## 2022-03-23 NOTE — DISCHARGE INSTRUCTIONS
Home Care After Ureteroscopy and Laser Lithotripsy  The following instructions will help you care for yourself, or be cared for upon your return home today.    These are guidelines for your care right after surgery only.     Diet  Drink plenty of liquids and eat light meals today.    Start your regular diet tomorrow.    Activity  Start normal activities in twenty-four (24) hours.    Wound Care and Hygiene  No restrictions, start normal routine.    Anesthesia Precautions & Expectations  After anesthesia, rest for 24 hours.    Do not drive, drink alcoholic beverages or make any important decisions during this time.  General anesthesia may cause a sore throat, jaw discomfort or muscle aches.    These symptoms can last for one or two days.     What to Expect after Surgery  Mild pain with voiding.  Frequency or urgency.  Bladder cramps.  Minimal bleeding with voiding.    Call your Doctor  Passing clots in urine preventing bladder emptying  Severe pain not controlled by oral medication  Temperature above 101.5 degrees  Inability to urinate within eight (8) hours after surgery    After Stent Placement  It is common to have blood tinged urine for 3-5 days.  It is common to have pain in your side and in your back when you urinate for 3-5 days.  It is common to have urgency with urination.  This is a temporary stent and will need to be removed in the office in 1 week.  Do not take the Pyridium 24 hours prior to your stent removal.    Other Contacts  Urology Office:  [address] [facility] [id number]   [place] [address] [phone] office  [phone] fax    Other Instructions  Take tamsulosin daily until the stent comes out.  Take oxybutynin daily as needed for bladder spasm while the stent is in.  Take Pyridium up to 3 times daily as needed for burning with urination.   Take Tylenol scheduled every 6 hours for the first 3 days.  Take the oxycodone on top of that as needed.  Take all of the antibiotics.     Follow up  Appointment  1 week for stent removal. Please See After visit Summary. Call if you do not have an appt already scheduled.     Please follow all post op instructions and follow up appointment time from your physician's office included in your discharge packet.  .  No pushing, pulling, tugging,  heavy lifting, or strenuous activity.  No major decision making, driving, or drinking alcoholic beverages for 24 hours. ( due to the medications you have  received)  Always use good hand hygiene/washing techniques.  NO driving while taking pain medications.    * if you have an incision:  Check your incision area every day for signs of infection.   Check for:  * more redness, swelling, or pain  *more fluid or blood  *warmth  *pus or bad smell     To assist you in voiding:  Drink plenty of fluids  Listen to running water while attempting to void.    If you are unable to urinate and you have an uncomfortable urge to void or it has been   6 hours since you were discharged, return to the Emergency Room

## 2022-03-23 NOTE — ANESTHESIA POSTPROCEDURE EVALUATION
Patient: Lorrie Plasencia    Procedure Summary     Date: 03/23/22 Room / Location: [facility] /  [place]    Anesthesia Start: 1306 Anesthesia Stop: 1419    Procedure: URETEROSCOPY LEFT, CYSTOSCOPY, LEFT RETROGRADE PYELOGRAM, LASER LITHOTRIPSY WITH STENT EXCHANGE LEFT (Left ) Diagnosis:       Nephrolithiasis      (Nephrolithiasis [N20.0])    Surgeons: Jeremy Mccall MD Provider: Lynne Joy CRNA    Anesthesia Type: general ASA Status: 2          Anesthesia Type: general    Vitals  Vitals Value Taken Time   /58 03/23/22 1503   Temp 98.1 °F (36.7 °C) 03/23/22 1503   Pulse 77 03/23/22 1528   Resp 16 03/23/22 1503   SpO2 83 % 03/23/22 1529   Vitals shown include unvalidated device data.        Post Anesthesia Care and Evaluation    Patient location during evaluation: PHASE II  Patient participation: complete - patient participated  Level of consciousness: awake  Pain score: 2  Pain management: adequate  Airway patency: patent  Anesthetic complications: No anesthetic complications  PONV Status: none  Cardiovascular status: acceptable  Respiratory status: acceptable  Hydration status: acceptable  No anesthesia care post op

## 2022-03-23 NOTE — H&P
HPI  Lorrie Plasencia is a 66 y.o. with history of   1. Nephrolithiasis         No recent fevers or new LUTS  Does not take any blood thinners    Past Medical History  Past Medical History:   Diagnosis Date   • COVID-19 vaccine series completed     moderna   • DVT (deep venous thrombosis) (HCC)     left leg   • GERD (gastroesophageal reflux disease)    • Goiter    • History of transfusion     No reaction   • Kidney stones    • Melanoma (HCC)    • Wears glasses        Past Surgical History  Past Surgical History:   Procedure Laterality Date   • COLONOSCOPY     • CYSTOSCOPY, RETROGRADE PYELOGRAM AND STENT INSERTION Left 3/16/2022    Procedure: CYSTOSCOPY RETROGRADE PYELOGRAM AND STENT INSERTION;  Surgeon: Jeremy Mccall MD;  Location: [facility];  Service: Urology;  Laterality: Left;   • SKIN CANCER EXCISION     • TUBAL ABDOMINAL LIGATION     • URETERAL STENT INSERTION     • WISDOM TOOTH EXTRACTION         Medications    Current Facility-Administered Medications:   •  levoFLOXacin (LEVAQUIN) 500 mg/100 mL D5W (premix) (LEVAQUIN) 500 mg, 500 mg, Intravenous, Q24H, Jeremy Mccall MD  •  sodium chloride 0.9 % flush 10 mL, 10 mL, Intravenous, PRN, Jeremy Mccall MD  •  sodium chloride 0.9 % infusion, 100 mL/hr, Intravenous, Continuous, Jeremy Mccall MD, Last Rate: 100 mL/hr at 03/23/22 1111, 100 mL/hr at 03/23/22 1111    Allergies  Allergies   Allergen Reactions   • Amoxicillin Hives   • Doxycycline Other (See Comments)     Blisters all over     • Nitrofurantoin Hives   • Penicillins Hives   • Sulfamethazine Rash       Social History  Social History     Socioeconomic History   • Marital status:    Tobacco Use   • Smoking status: Never Smoker   • Smokeless tobacco: Never Used   Vaping Use   • Vaping Use: Never used   Substance and Sexual Activity   • Alcohol use: Never   • Drug use: Never   • Sexual activity: Defer       Review of Systems  Constitutional: No fevers or chills  Skin:  Negative for rash  Endocrine: No heat/cold intolerance   Cardiovascular: Negative for chest pain or dyspnea on exertion  Respiratory: Negative for shortness of breath or wheezing  Gastrointestinal: No constipation, nausea or vomiting  Genitourinary: Negative for new lower urinary tract symptoms, current gross hematuria or dysuria.  Musculoskeletal: No flank pain  Neurological:  Negative for frequent headaches or dizziness  Lymph/Heme: Negative for leg swelling or calf pain.    Physical Exam  Visit Vitals  /68 (BP Location: Right arm, Patient Position: Sitting)   Pulse 65   Temp 98.2 °F (36.8 °C) (Temporal)   Resp 18   SpO2 (!) 7%     Constitutional: NAD, WDWN.   HEENT: NCAT. Conjunctivae normal.  MMM.    Cardiovascular: Regular rate.  Pulmonary/Chest: Respirations are even and non-labored bilaterally.  Abdominal: Soft. No distension, tenderness, masses or guarding. No CVA tenderness.  Neurological: A + O x 3.  Cranial Nerves II-XII grossly intact. Normal gait.  Extremities: MAIKOL x 4, Warm. No clubbing.  No cyanosis.    Skin: Pink, warm and dry.  No rashes noted.  Psychiatric:  Normal mood and affect    Labs & Imaging  Lab Results   Component Value Date    GLUCOSE 105 (H) 02/05/2022    CALCIUM 9.6 02/05/2022     02/05/2022    K 4.0 02/05/2022    CO2 25.4 02/05/2022    CL 99 02/05/2022    BUN 12 02/05/2022    CREATININE 0.98 02/05/2022    EGFRIFAFRI 69 02/05/2022    EGFRIFNONA 57 (L) 02/05/2022    BCR 12.2 02/05/2022    ANIONGAP 12.6 02/05/2022     Lab Results   Component Value Date    WBC 9.56 03/22/2022    HGB 14.0 03/22/2022    HCT 42.1 03/22/2022    MCV 84.7 03/22/2022     03/22/2022     Brief Urine Lab Results  (Last result in the past 365 days)      Color   Clarity   Blood   Leuk Est   Nitrite   Protein   CREAT   Urine HCG        03/10/22 1016 Yellow   Clear   2+   Negative   Negative   1+               Urine Culture    Urine Culture 2/5/22 3/10/22   Urine Culture >100,000 CFU/mL Klebsiella  pneumoniae ssp pneumoniae (A) Final report (A)   (A) Abnormal value               No results found.        Assessment  Lorrie Plasencia is a 66 y.o. female who presents with the following diagnosis:  1. Nephrolithiasis         Plan  1. To OR for LEFT URETEROSCOPY LASER LITHOTRIPSY WITH STENT EXCHANGE     Jeremy Mccall MD

## 2022-03-23 NOTE — ANESTHESIA PREPROCEDURE EVALUATION
Anesthesia Evaluation     Patient summary reviewed and Nursing notes reviewed   no history of anesthetic complications:  NPO Solid Status: > 8 hours  NPO Liquid Status: > 8 hours           Airway   Mallampati: I  TM distance: >3 FB  Neck ROM: full  no difficulty expected and Possible difficult intubation  Dental - normal exam     Pulmonary - negative pulmonary ROS and normal exam   Cardiovascular - normal exam    ECG reviewed    (+) DVT,       Neuro/Psych- negative ROS  GI/Hepatic/Renal/Endo    (+)  GERD,  renal disease, thyroid problem     Musculoskeletal (-) negative ROS    Abdominal    Substance History - negative use     OB/GYN negative ob/gyn ROS         Other      history of cancer                      Anesthesia Plan    ASA 2     general   (Risks and benefits discussed including risk of aspiration, recall and dental damage. All patient questions answered. Will continue with POC.)  intravenous induction     Anesthetic plan, all risks, benefits, and alternatives have been provided, discussed and informed consent has been obtained with: patient.        CODE STATUS:

## 2022-03-23 NOTE — OP NOTE
Preoperative diagnosis  left kidney stone    Postoperative diagnosis  left kidney stone    Procedure performed  1.  Flexible cystoscopy, left retrograde pyelogram with ureteral stent exchange 6 Fr x 24 cm  2.  left ureteroscopy with holmium-YAG laser lithotripsy and basket extraction of stone fragments (14321)   3.  Fluoroscopy time < 1 hour with interpretation of images    Surgeon  Jeremy Mccall MD    Anesthesia  General    Complications  None    Specimen  Stone fragments for biochemical analysis    EBL  Minimal    Findings  Urethroscopy revealed no strictures or other abnormalities.  Cystoscopy revealed no tumors, stones or other mucosal abnormalities.  Retrograde pyelogram revealed a delicate system with identification of the stone seen on pre-op imaging.  No other filling defects and caliber of left ureter was smooth and normal.  Ureteroscopy revealed left distal ureteral stone and left lower pole renal stone.     Indications  66 y.o. female agreed to undergo the above named procedure after discussion of the alternatives, risks and benefits.  Informed consent was obtained.      Procedure  The patient was taken to the operating room, identified by name and medical record number, and placed supine on the operating table.  Pre-operative antibiotics were administered.  Bilateral lower extremity SCDs were placed.  After induction of general anesthesia the patient was positioned in dorsal lithotomy, prepped and draped in a sterile fashion.  A time-out was performed.      A 14-Yi flexible cystoscope was passed carefully via urethra into the bladder.  The left ureteral orifice was identified and a Sensor wire was passed retrograde to the level of the kidney and confirmed by fluoroscopy.  The previous indwelling stent was grasped and removed.  The flexible scope was off-loaded and the bladder emptied with a straight catheter.  We then entered the distal ureter with the semirigid ureteroscope.  The distal ureteral  stone was identified and fragmented with the CloudRunner I/O laser fiber with settings of 0.4 J and 14 Hz.. A retrograde pyelogram was performed by slowly injecting 5 mL of 50% Omnipaque contrast via the catheter with findings described above.  An Amplatz super-stiff was placed to the level of the renal pelvis and confirmed by fluoroscopy. The dual lumen was removed. The Sensor wire was clipped to the drape as a safety wire.  A 12/14, 28-cm access sheath was advanced over the super-stiff wire to level of the UPJ under direct fluoroscopic guidance. The kidney was entered with the flexible ureteroscope.  The kidney stone in the lower pole was identified and fragmented with a 200-micron holmium YAG laser fiber at laser settings of 0.2 joules and a frequency of 120 hz. The fragments were basket extracted. At the completion of the procedure, all clinically significant fragments were removed and only dust-like fragments remained.  The access sheath was removed under direct vision.  Ureteral edema but no obvious obstruction was present. A retrograde pyelogram was performed and a  6 Fr x 24 cm stent was positioned with the upper end in the kidney and the lower in the bladder confirmed by fluoroscopy. The bladder was emptied and the procedure was complete. The patient tolerated the procedure well and was stable throughout.    The patient will follow up with me next week for stent removal.

## 2022-03-23 NOTE — ANESTHESIA PROCEDURE NOTES
Airway  Urgency: elective    Date/Time: 3/23/2022 1:14 PM    General Information and Staff    Patient location during procedure: OR  CRNA: Lynne Joy CRNA    Indications and Patient Condition    Preoxygenated: yes  Mask difficulty assessment: 1 - vent by mask    Final Airway Details  Final airway type: supraglottic airway      Successful airway: unique  Size 4  Airway Seal Pressure (cm H2O): 20    Number of attempts at approach: 1  Assessment: lips, teeth, and gum same as pre-op    Additional Comments  LMA seats well

## 2022-03-30 ENCOUNTER — OFFICE VISIT (OUTPATIENT)
Dept: UROLOGY | Facility: CLINIC | Age: 67
End: 2022-03-30

## 2022-03-30 DIAGNOSIS — N20.0 NEPHROLITHIASIS: Primary | ICD-10-CM

## 2022-03-30 PROCEDURE — 52310 CYSTOSCOPY AND TREATMENT: CPT | Performed by: UROLOGY

## 2022-03-30 NOTE — PROGRESS NOTES
Preoperative diagnosis  Foreign body in genitourinary tract    Postoperative diagnosis  Foreign body in genitourinary tract    Procedure  Flexible cystourethroscopy with stent removal    Attending surgeon  Jeremy Mccall MD    Anesthesia  2% lidocaine jelly intraurethrally    Complications  None    Indications  66 y.o. female who is status post ureteroscopy with laser lithotripsy who presents for stent removal.    Procedure  Detailed information of all possible complications and side effects were discussed with the patient.  Informed consent was obtained. Patient was given one dose of antibiotics. The patient was placed in supine position and a timeout was performed. The patient was prepped and draped in sterile fashion.  Next, 2% lidocaine jelly was bluntly injected per urethra without difficulty. The 14 Lithuanian flexible cystoscope was passed through the urethra and into the bladder.  The stent was visualized, grasped and removed in its entirety.  The patient tolerated the procedure well.    Plan  1. Provided education regarding water intake of at least 2 liters per day  2. F/u in 8 weeks with a renal ultrasound w/ Campbell

## 2022-05-19 ENCOUNTER — HOSPITAL ENCOUNTER (OUTPATIENT)
Dept: ULTRASOUND IMAGING | Facility: HOSPITAL | Age: 67
Discharge: HOME | End: 2022-05-19
Admitting: UROLOGY

## 2022-05-19 DIAGNOSIS — N20.0 NEPHROLITHIASIS: ICD-10-CM

## 2022-05-19 PROCEDURE — 76775 US EXAM ABDO BACK WALL LIM: CPT

## 2022-06-24 ENCOUNTER — OFFICE VISIT (OUTPATIENT)
Dept: UROLOGY | Facility: CLINIC | Age: 67
End: 2022-06-24

## 2022-06-24 VITALS
SYSTOLIC BLOOD PRESSURE: 126 MMHG | OXYGEN SATURATION: 98 % | HEIGHT: 63 IN | WEIGHT: 169 LBS | BODY MASS INDEX: 29.95 KG/M2 | DIASTOLIC BLOOD PRESSURE: 72 MMHG | HEART RATE: 65 BPM | TEMPERATURE: 97.1 F

## 2022-06-24 DIAGNOSIS — N20.0 NEPHROLITHIASIS: Primary | ICD-10-CM

## 2022-06-24 LAB
BILIRUB BLD-MCNC: NEGATIVE MG/DL
CLARITY, POC: CLEAR
COLOR UR: YELLOW
EXPIRATION DATE: ABNORMAL
GLUCOSE UR STRIP-MCNC: NEGATIVE MG/DL
KETONES UR QL: NEGATIVE
LEUKOCYTE EST, POC: NEGATIVE
Lab: ABNORMAL
NITRITE UR-MCNC: NEGATIVE MG/ML
PH UR: 6 [PH] (ref 5–8)
PROT UR STRIP-MCNC: ABNORMAL MG/DL
RBC # UR STRIP: ABNORMAL /UL
SP GR UR: 1.02 (ref 1–1.03)
UROBILINOGEN UR QL: NORMAL

## 2022-06-24 PROCEDURE — 99213 OFFICE O/P EST LOW 20 MIN: CPT | Performed by: PHYSICIAN ASSISTANT

## 2022-06-24 PROCEDURE — 81003 URINALYSIS AUTO W/O SCOPE: CPT | Performed by: PHYSICIAN ASSISTANT

## 2022-06-24 NOTE — PROGRESS NOTES
"Chief Complaint   Patient presents with   • Nephrolithiasis     Pt in office for 2 month follow up with ultrasound         HPI  Ms. Plasencia is a 67 y.o. female with history of nephrolithiasis s/p left URS/LL 03/23/2022 who presents for follow up.     At this visit, is having intermittent lower abdominal pain. Denies dysuria or hematuria.     Past Medical History:   Diagnosis Date   • COVID-19 vaccine series completed     moderna   • DVT (deep venous thrombosis) (HCC)     left leg   • GERD (gastroesophageal reflux disease)    • Goiter    • History of transfusion     No reaction   • Kidney stones    • Melanoma (HCC)    • Wears glasses        Past Surgical History:   Procedure Laterality Date   • COLONOSCOPY     • CYSTOSCOPY, RETROGRADE PYELOGRAM AND STENT INSERTION Left 3/16/2022    Procedure: CYSTOSCOPY RETROGRADE PYELOGRAM AND STENT INSERTION;  Surgeon: Jeremy Mccall MD;  Location: [facility];  Service: Urology;  Laterality: Left;   • SKIN CANCER EXCISION     • TUBAL ABDOMINAL LIGATION     • URETERAL STENT INSERTION     • URETEROSCOPY LASER LITHOTRIPSY WITH STENT INSERTION Left 3/23/2022    Procedure: URETEROSCOPY LEFT, CYSTOSCOPY, LEFT RETROGRADE PYELOGRAM, LASER LITHOTRIPSY WITH STENT EXCHANGE LEFT;  Surgeon: Jeremy Mccall MD;  Location: [facility];  Service: Urology;  Laterality: Left;   • WISDOM TOOTH EXTRACTION           Current Outpatient Medications:   •  vitamin D (ERGOCALCIFEROL) 1.25 MG (79612 UT) capsule capsule, Take 50,000 Units by mouth Every 7 (Seven) Days., Disp: , Rfl:      Physical Exam  Visit Vitals  /72 (BP Location: Right arm, Patient Position: Sitting, Cuff Size: Adult)   Pulse 65   Temp 97.1 °F (36.2 °C)   Ht 160 cm (63\")   Wt 76.7 kg (169 lb)   SpO2 98%   BMI 29.94 kg/m²       Labs  Brief Urine Lab Results  (Last result in the past 365 days)      Color   Clarity   Blood   Leuk Est   Nitrite   Protein   CREAT   Urine HCG        06/24/22 1332 Yellow   Clear   3+   Negative   " Negative   2+                 Lab Results   Component Value Date    GLUCOSE 105 (H) 02/05/2022    CALCIUM 9.6 02/05/2022     02/05/2022    K 4.0 02/05/2022    CO2 25.4 02/05/2022    CL 99 02/05/2022    BUN 12 02/05/2022    CREATININE 0.98 02/05/2022    EGFRIFAFRI 69 02/05/2022    EGFRIFNONA 57 (L) 02/05/2022    BCR 12.2 02/05/2022    ANIONGAP 12.6 02/05/2022       Lab Results   Component Value Date    WBC 9.56 03/22/2022    HGB 14.0 03/22/2022    HCT 42.1 03/22/2022    MCV 84.7 03/22/2022     03/22/2022       Urine Culture    Urine Culture 2/5/22 3/10/22   Urine Culture >100,000 CFU/mL Klebsiella pneumoniae ssp pneumoniae (A) Final report (A)   (A) Abnormal value               Radiographic Studies  US Renal Bilateral    Result Date: 5/19/2022  Unremarkable renal ultrasound.  This report was signed and finalized on 5/19/2022 4:13 PM by Hero Padgett MD.      I have reviewed the above labs and imaging.     Assessment  67 y.o. female with history of nephrolithiasis presenting for follow-up.  On her ultrasound, hydronephrosis has resolved.  Notably, she has a large amount of microscopic blood detected by dip today.  I have reviewed her original CT imaging and a calcification versus stone is present in the right proximal ureter/renal pelvis.  I will have Dr. Mccall review these images for final determination as this calcification was not interpreted by radiology.    Regarding risk for stone formation, the patient admits to low fluid intake.  I have encouraged her to drink at least 2 L of fluid per day, ideally water.    Plan  1.  Increase fluid intake with goal of 64 ounces per day  2.  Follow-up in 6 months with KUB prior  3.  Obtain urine micro today to confirm presence of microscopic hematuria

## 2022-06-27 ENCOUNTER — TELEPHONE (OUTPATIENT)
Dept: UROLOGY | Facility: CLINIC | Age: 67
End: 2022-06-27

## 2022-06-27 NOTE — TELEPHONE ENCOUNTER
Caller: KEVIN SHEIKH    Relationship to patient: SELF    Best call back number: [phone]    Patient is needing: PT STATES SHE RECEIVED CALL FROM SOMEONE AT OFFICE BUT UNSURE WHO AS SHE ACCIDENTALLY HUNG UP. WOULD LIKE CALL BACK, PLEASE.

## 2022-06-28 DIAGNOSIS — N20.0 NEPHROLITHIASIS: Primary | ICD-10-CM

## 2022-06-29 ENCOUNTER — TELEPHONE (OUTPATIENT)
Dept: UROLOGY | Facility: CLINIC | Age: 67
End: 2022-06-29

## 2022-06-29 NOTE — TELEPHONE ENCOUNTER
Caller: Lorrie Plasencia    Relationship to patient: Self    Best call back number: [phone]    Patient is needing: PT BELIEVES SHE MISSED A CALL FROM THE OFFICE, NO NOTES FOUND IN CHART FOR TODAY. PLEASE GIVE PT A CALL BACK. SHE THINKS IT MAY HAVE BEEN JOSE OR ANGELO?

## 2022-07-07 ENCOUNTER — HOSPITAL ENCOUNTER (OUTPATIENT)
Dept: CT IMAGING | Facility: HOSPITAL | Age: 67
Discharge: HOME | End: 2022-07-07
Admitting: UROLOGY

## 2022-07-07 ENCOUNTER — PROCEDURE VISIT (OUTPATIENT)
Dept: UROLOGY | Facility: CLINIC | Age: 67
End: 2022-07-07

## 2022-07-07 DIAGNOSIS — N20.0 NEPHROLITHIASIS: ICD-10-CM

## 2022-07-07 DIAGNOSIS — R31.9 HEMATURIA, UNSPECIFIED TYPE: Primary | ICD-10-CM

## 2022-07-07 PROCEDURE — 74176 CT ABD & PELVIS W/O CONTRAST: CPT

## 2022-07-07 PROCEDURE — 52000 CYSTOURETHROSCOPY: CPT | Performed by: UROLOGY

## 2022-07-07 NOTE — PROGRESS NOTES
Preprocedure diagnosis  Microscopic hematuria    Postprocedure diagnosis  No abnormalities identified    Procedure  Flexible Cystourethroscopy    Attending surgeon  Jeremy Mccall MD    Anesthesia  2% lidocaine jelly intraurethrally    Complications  None    Indications  67 y.o. female undergoing a flexible cystoscopy for the above mentioned indications.    Informed consent was obtained.      Findings  Cystoscopy revealed one right and left ureteral orifice in the normal anatomic position, normal bladder mucosa and no tumors, masses or stones.      Procedure  The patient was placed in supine position and prepped and draped in sterile fashion with lidocaine jelly per urethra for anesthesia.  A timeout was performed.  The 14F flexible cystoscope was lubricated and gently placed through the urethra and into the bladder.  The bladder was completely visualized.  The cystoscope was retroflexed and the bladder neck visualized.  The scope was withdrawn and the procedure terminated.  The patient tolerated the procedure well.          CT Abdomen Pelvis Stone Protocol    Result Date: 7/7/2022  Tiny nonobstructing bilateral nephrolithiasis. No hydronephrosis. Left renal parenchymal scarring. Stable left renal inferior pole simple cyst.      Images personally reviewed, interpreted and dictated by BONILLA Richmond.       This report was signed and finalized on 7/7/2022 1:54 PM by BONILLA Richmond.    I do not actually see any major stones of the collecting system.  Her hematuria work-up is negative.  FU PRN

## 2022-10-24 ENCOUNTER — TRANSCRIBE ORDERS (OUTPATIENT)
Dept: GENERAL RADIOLOGY | Facility: HOSPITAL | Age: 67
End: 2022-10-24

## 2022-10-24 ENCOUNTER — HOSPITAL ENCOUNTER (OUTPATIENT)
Dept: GENERAL RADIOLOGY | Facility: HOSPITAL | Age: 67
Discharge: HOME | End: 2022-10-24
Admitting: NURSE PRACTITIONER

## 2022-10-24 DIAGNOSIS — M25.562 CHRONIC PAIN OF LEFT KNEE: Primary | ICD-10-CM

## 2022-10-24 DIAGNOSIS — G89.29 CHRONIC PAIN OF LEFT KNEE: ICD-10-CM

## 2022-10-24 DIAGNOSIS — M25.562 CHRONIC PAIN OF LEFT KNEE: ICD-10-CM

## 2022-10-24 DIAGNOSIS — G89.29 CHRONIC PAIN OF LEFT KNEE: Primary | ICD-10-CM

## 2022-10-24 PROCEDURE — 73562 X-RAY EXAM OF KNEE 3: CPT

## 2023-01-10 PROBLEM — A04.8 HELICOBACTER PYLORI INFECTION: Status: ACTIVE | Noted: 2023-01-10

## 2023-01-10 PROBLEM — R10.9 LEFT FLANK PAIN: Status: ACTIVE | Noted: 2023-01-10

## 2023-01-10 PROBLEM — N20.0 KIDNEY STONE: Status: ACTIVE | Noted: 2023-01-10

## 2023-01-11 ENCOUNTER — HOSPITAL ENCOUNTER (OUTPATIENT)
Dept: GENERAL RADIOLOGY | Facility: HOSPITAL | Age: 68
Discharge: HOME | End: 2023-01-11
Admitting: PHYSICIAN ASSISTANT
Payer: MEDICARE

## 2023-01-11 ENCOUNTER — OFFICE VISIT (OUTPATIENT)
Dept: UROLOGY | Facility: CLINIC | Age: 68
End: 2023-01-11
Payer: MEDICARE

## 2023-01-11 VITALS
TEMPERATURE: 96.9 F | OXYGEN SATURATION: 98 % | HEART RATE: 74 BPM | HEIGHT: 63 IN | DIASTOLIC BLOOD PRESSURE: 62 MMHG | WEIGHT: 159 LBS | BODY MASS INDEX: 28.17 KG/M2 | SYSTOLIC BLOOD PRESSURE: 126 MMHG

## 2023-01-11 DIAGNOSIS — N20.0 NEPHROLITHIASIS: ICD-10-CM

## 2023-01-11 DIAGNOSIS — R31.29 MICROSCOPIC HEMATURIA: Primary | ICD-10-CM

## 2023-01-11 LAB
BILIRUB BLD-MCNC: NEGATIVE MG/DL
CLARITY, POC: CLEAR
COLOR UR: YELLOW
EXPIRATION DATE: ABNORMAL
GLUCOSE UR STRIP-MCNC: NEGATIVE MG/DL
KETONES UR QL: NEGATIVE
LEUKOCYTE EST, POC: NEGATIVE
Lab: ABNORMAL
NITRITE UR-MCNC: NEGATIVE MG/ML
PH UR: 6 [PH] (ref 5–8)
PROT UR STRIP-MCNC: NEGATIVE MG/DL
RBC # UR STRIP: ABNORMAL /UL
SP GR UR: 1.01 (ref 1–1.03)
UROBILINOGEN UR QL: NORMAL

## 2023-01-11 PROCEDURE — 99214 OFFICE O/P EST MOD 30 MIN: CPT | Performed by: PHYSICIAN ASSISTANT

## 2023-01-11 PROCEDURE — 74018 RADEX ABDOMEN 1 VIEW: CPT

## 2023-01-11 PROCEDURE — 81003 URINALYSIS AUTO W/O SCOPE: CPT | Performed by: PHYSICIAN ASSISTANT

## 2023-01-11 RX ORDER — ESTRADIOL 0.1 MG/G
2 CREAM VAGINAL DAILY
Qty: 42.5 G | Refills: 12 | Status: SHIPPED | OUTPATIENT
Start: 2023-01-11

## 2023-01-11 NOTE — PROGRESS NOTES
"Chief Complaint   Patient presents with   • Blood in Urine     With LUTS per pt.        HPI  Ms. Plasencia is a 67 y.o. female with history of nephrolithiasis who presents for follow up.     At this visit, has not had consistent flank pain or dysuria concerning for stone. Does not about 1 week of suprapubic tenderness without gross hematuria or dysuria, frequency or urgency.         Past Medical History:   Diagnosis Date   • COVID-19 vaccine series completed     moderna   • DVT (deep venous thrombosis) (HCC)     left leg   • GERD (gastroesophageal reflux disease)    • Goiter    • History of transfusion     No reaction   • Kidney stones    • Melanoma (HCC)    • Wears glasses        Past Surgical History:   Procedure Laterality Date   • COLONOSCOPY     • CYSTOSCOPY, RETROGRADE PYELOGRAM AND STENT INSERTION Left 3/16/2022    Procedure: CYSTOSCOPY RETROGRADE PYELOGRAM AND STENT INSERTION;  Surgeon: Jeremy Mccall MD;  Location: [facility];  Service: Urology;  Laterality: Left;   • SKIN CANCER EXCISION     • TUBAL ABDOMINAL LIGATION     • URETERAL STENT INSERTION     • URETEROSCOPY LASER LITHOTRIPSY WITH STENT INSERTION Left 3/23/2022    Procedure: URETEROSCOPY LEFT, CYSTOSCOPY, LEFT RETROGRADE PYELOGRAM, LASER LITHOTRIPSY WITH STENT EXCHANGE LEFT;  Surgeon: Jeremy Mccall MD;  Location: [facility];  Service: Urology;  Laterality: Left;   • WISDOM TOOTH EXTRACTION           Current Outpatient Medications:   •  vitamin D (ERGOCALCIFEROL) 1.25 MG (98078 UT) capsule capsule, Take 50,000 Units by mouth Every 7 (Seven) Days., Disp: , Rfl:      Physical Exam  Visit Vitals  /62   Pulse 74   Temp 96.9 °F (36.1 °C)   Ht 160 cm (62.99\")   Wt 72.1 kg (159 lb)   SpO2 98%   BMI 28.17 kg/m²       Labs  Brief Urine Lab Results  (Last result in the past 365 days)      Color   Clarity   Blood   Leuk Est   Nitrite   Protein   CREAT   Urine HCG        01/11/23 1042 Yellow   Clear   2+   Negative   Negative   Negative        "          Lab Results   Component Value Date    GLUCOSE 105 (H) 02/05/2022    CALCIUM 9.6 02/05/2022     02/05/2022    K 4.0 02/05/2022    CO2 25.4 02/05/2022    CL 99 02/05/2022    BUN 12 02/05/2022    CREATININE 0.98 02/05/2022    EGFRIFAFRI 69 02/05/2022    EGFRIFNONA 57 (L) 02/05/2022    BCR 12.2 02/05/2022    ANIONGAP 12.6 02/05/2022       Lab Results   Component Value Date    WBC 11.1 (H) 10/11/2022    HGB 14.5 10/11/2022    HCT 44.8 10/11/2022    MCV 86.2 10/11/2022     10/11/2022       Urine Culture    Urine Culture 2/5/22 3/10/22   Urine Culture >100,000 CFU/mL Klebsiella pneumoniae ssp pneumoniae (A) Final report (A)   (A) Abnormal value               Assessment  67 y.o. female with hx of nephrolithiasis presenting for surveillance follow up.     Her KUB interpreted initially be me (pending radiology) is without large stone burden, though stable right renal vasculature calcification remains present.  Additionally of note, she continues to have positive blood on point-of-care urine testing.  We will fully investigate this with microscopy and culture.  I suspect she may have a UTI given her description of suprapubic tenderness.  We also discussed the possibility that she is having blood from changes of GSM as she went through menopause about 10 years ago.  She does admit to occasional itchiness in the vulva and vagina.    Plan  1.  Obtain urine micro and culture  2.  Start periurethral Estrace    Follow-up in 1 month for repeat assessment of urine

## 2023-01-13 LAB
APPEARANCE UR: CLEAR
BACTERIA #/AREA URNS HPF: ABNORMAL /HPF
BACTERIA UR CULT: NORMAL
BACTERIA UR CULT: NORMAL
BILIRUB UR QL STRIP: NEGATIVE
CASTS URNS MICRO: ABNORMAL
COLOR UR: YELLOW
EPI CELLS #/AREA URNS HPF: ABNORMAL /HPF
GLUCOSE UR QL STRIP: NEGATIVE
HGB UR QL STRIP: ABNORMAL
KETONES UR QL STRIP: NEGATIVE
LEUKOCYTE ESTERASE UR QL STRIP: NEGATIVE
NITRITE UR QL STRIP: NEGATIVE
PH UR STRIP: 7.5 [PH] (ref 5–8)
PROT UR QL STRIP: ABNORMAL
RBC #/AREA URNS HPF: ABNORMAL /HPF
SP GR UR STRIP: 1.01 (ref 1–1.03)
UROBILINOGEN UR STRIP-MCNC: ABNORMAL MG/DL
WBC #/AREA URNS HPF: ABNORMAL /HPF

## 2023-03-14 ENCOUNTER — TELEPHONE (OUTPATIENT)
Dept: UROLOGY | Facility: CLINIC | Age: 68
End: 2023-03-14

## 2023-03-14 NOTE — TELEPHONE ENCOUNTER
Provider: OXANA WILSON  Caller: Lorrie Plasencia  Relationship to Patient: SELF    Phone Number: [phone]  Reason for Call: PT'S APPT TODAY HAS BEEN RESCHEDULED TO 3-28-23, PT'S SON IS SICK AND SHE HAS TO TAKE HIM TO THE DR

## 2023-03-28 ENCOUNTER — OFFICE VISIT (OUTPATIENT)
Dept: UROLOGY | Facility: CLINIC | Age: 68
End: 2023-03-28
Payer: MEDICARE

## 2023-03-28 VITALS
TEMPERATURE: 98.2 F | DIASTOLIC BLOOD PRESSURE: 68 MMHG | WEIGHT: 156 LBS | OXYGEN SATURATION: 98 % | HEIGHT: 63 IN | SYSTOLIC BLOOD PRESSURE: 124 MMHG | BODY MASS INDEX: 27.64 KG/M2 | HEART RATE: 74 BPM

## 2023-03-28 DIAGNOSIS — N28.89 LEFT RENAL MASS: ICD-10-CM

## 2023-03-28 DIAGNOSIS — R31.9 HEMATURIA, UNSPECIFIED TYPE: Primary | ICD-10-CM

## 2023-03-28 LAB
BILIRUB BLD-MCNC: NEGATIVE MG/DL
CLARITY, POC: CLEAR
COLOR UR: ABNORMAL
EXPIRATION DATE: ABNORMAL
GLUCOSE UR STRIP-MCNC: NEGATIVE MG/DL
KETONES UR QL: NEGATIVE
LEUKOCYTE EST, POC: NEGATIVE
Lab: ABNORMAL
NITRITE UR-MCNC: NEGATIVE MG/ML
PH UR: 6 [PH] (ref 5–8)
PROT UR STRIP-MCNC: ABNORMAL MG/DL
RBC # UR STRIP: ABNORMAL /UL
SP GR UR: 1.02 (ref 1–1.03)
UROBILINOGEN UR QL: NORMAL

## 2023-03-28 PROCEDURE — 1159F MED LIST DOCD IN RCRD: CPT | Performed by: PHYSICIAN ASSISTANT

## 2023-03-28 PROCEDURE — 1160F RVW MEDS BY RX/DR IN RCRD: CPT | Performed by: PHYSICIAN ASSISTANT

## 2023-03-28 PROCEDURE — 99213 OFFICE O/P EST LOW 20 MIN: CPT | Performed by: PHYSICIAN ASSISTANT

## 2023-03-28 PROCEDURE — 81003 URINALYSIS AUTO W/O SCOPE: CPT | Performed by: PHYSICIAN ASSISTANT

## 2023-03-28 NOTE — PROGRESS NOTES
Chief Complaint   Patient presents with   • Blood in Urine     f   • Follow-up     4 week follow-up / hematuria        HPI  Ms. Plasencia is a 67 y.o. female with history of nephrolithiasis who presents for follow up.     At this visit, tells me she has not had any gross hematuria, spotting, or vaginal bleeding. Her last PAP was at least 2 years ago. She has been using periurethral estrace, noticed no difference as she had very minimal symptoms anyway.    Past Medical History:   Diagnosis Date   • COVID-19 vaccine series completed     moderna   • DVT (deep venous thrombosis) (HCC)     left leg   • GERD (gastroesophageal reflux disease)    • Goiter    • History of transfusion     No reaction   • Kidney stones    • Melanoma (HCC)    • Wears glasses        Past Surgical History:   Procedure Laterality Date   • COLONOSCOPY     • CYSTOSCOPY, RETROGRADE PYELOGRAM AND STENT INSERTION Left 3/16/2022    Procedure: CYSTOSCOPY RETROGRADE PYELOGRAM AND STENT INSERTION;  Surgeon: Jeremy Mccall MD;  Location: [facility];  Service: Urology;  Laterality: Left;   • SKIN CANCER EXCISION     • TUBAL ABDOMINAL LIGATION     • URETERAL STENT INSERTION     • URETEROSCOPY LASER LITHOTRIPSY WITH STENT INSERTION Left 3/23/2022    Procedure: URETEROSCOPY LEFT, CYSTOSCOPY, LEFT RETROGRADE PYELOGRAM, LASER LITHOTRIPSY WITH STENT EXCHANGE LEFT;  Surgeon: Jeremy Mccall MD;  Location: [facility];  Service: Urology;  Laterality: Left;   • WISDOM TOOTH EXTRACTION           Current Outpatient Medications:   •  cholecalciferol (VITAMIN D3) 25 MCG (1000 UT) tablet, , Disp: , Rfl:   •  estradiol (ESTRACE) 0.1 MG/GM vaginal cream, Insert 2 g into the vagina Daily. Dispose of applicator. Apply a pea-sized amount to the urethra nightly for 2 weeks. Can decrease to every other night from then on., Disp: 42.5 g, Rfl: 12  •  vitamin D (ERGOCALCIFEROL) 1.25 MG (54043 UT) capsule capsule, Take 50,000 Units by mouth Every 7 (Seven) Days., Disp: , Rfl:       Physical Exam  There were no vitals taken for this visit.    Labs  Brief Urine Lab Results  (Last result in the past 365 days)      Color   Clarity   Blood   Leuk Est   Nitrite   Protein   CREAT   Urine HCG        01/11/23 1419 Yellow  Comment: REFERENCE RANGE: Yellow, Straw   Clear   See below:  Comment: Small (1+)   Negative   Negative   See below:  Comment: 30 mg/dL (1+)                 Lab Results   Component Value Date    GLUCOSE 105 (H) 02/05/2022    CALCIUM 9.6 02/05/2022     02/05/2022    K 4.0 02/05/2022    CO2 25.4 02/05/2022    CL 99 02/05/2022    BUN 12 02/05/2022    CREATININE 0.98 02/05/2022    EGFRIFAFRI 69 02/05/2022    EGFRIFNONA 57 (L) 02/05/2022    BCR 12.2 02/05/2022    ANIONGAP 12.6 02/05/2022       Lab Results   Component Value Date    WBC 11.1 (H) 10/11/2022    HGB 14.5 10/11/2022    HCT 44.8 10/11/2022    MCV 86.2 10/11/2022     10/11/2022       Urine Culture    Urine Culture 1/11/23   Urine Culture Final report              Assessment  67 y.o. female with history of nephrolithiasis and microscopic hematuria.    At her follow-up visit in January, her KUB was noted to have renal vascular calcification only, no other stone burden noted.  She had microscopic hematuria that was confirmed with 6-12 RBCs per high-powered field.  Scant epithelial cells.  We review her last CT from July 2022 when she had an ill-defined cystic mass on the left kidney.  Otherwise, her cystoscopy was just performed in July of 2022 and benign.     Plan  1. FU after CT Urogram, in 3 months  2. Likely annual FU with surveillance UA following; cysto for worsening of micro hematuria

## 2023-03-29 LAB
APPEARANCE UR: CLEAR
BACTERIA #/AREA URNS HPF: ABNORMAL /HPF
BILIRUB UR QL STRIP: NEGATIVE
CASTS URNS MICRO: ABNORMAL
COLOR UR: YELLOW
EPI CELLS #/AREA URNS HPF: ABNORMAL /HPF
GLUCOSE UR QL STRIP: NEGATIVE
HGB UR QL STRIP: ABNORMAL
KETONES UR QL STRIP: NEGATIVE
LEUKOCYTE ESTERASE UR QL STRIP: NEGATIVE
NITRITE UR QL STRIP: NEGATIVE
PH UR STRIP: 6.5 [PH] (ref 5–8)
PROT UR QL STRIP: ABNORMAL
RBC #/AREA URNS HPF: ABNORMAL /HPF
SP GR UR STRIP: 1.02 (ref 1–1.03)
UROBILINOGEN UR STRIP-MCNC: ABNORMAL MG/DL
WBC #/AREA URNS HPF: ABNORMAL /HPF

## 2023-05-02 ENCOUNTER — HOSPITAL ENCOUNTER (OUTPATIENT)
Dept: CT IMAGING | Facility: HOSPITAL | Age: 68
Discharge: HOME | End: 2023-05-02
Admitting: PHYSICIAN ASSISTANT
Payer: MEDICARE

## 2023-05-02 DIAGNOSIS — N28.89 LEFT RENAL MASS: ICD-10-CM

## 2023-05-02 DIAGNOSIS — R31.9 HEMATURIA, UNSPECIFIED TYPE: ICD-10-CM

## 2023-05-02 PROCEDURE — 25510000001 IOPAMIDOL 61 % SOLUTION: Performed by: PHYSICIAN ASSISTANT

## 2023-05-02 PROCEDURE — 74178 CT ABD&PLV WO CNTR FLWD CNTR: CPT

## 2023-05-02 RX ADMIN — IOPAMIDOL 100 ML: 612 INJECTION, SOLUTION INTRAVENOUS at 12:43

## 2023-09-06 ENCOUNTER — OFFICE VISIT (OUTPATIENT)
Dept: ENDOCRINOLOGY | Facility: CLINIC | Age: 68
End: 2023-09-06
Payer: MEDICARE

## 2023-09-06 VITALS
HEIGHT: 63 IN | DIASTOLIC BLOOD PRESSURE: 69 MMHG | HEART RATE: 90 BPM | BODY MASS INDEX: 28.53 KG/M2 | WEIGHT: 161 LBS | SYSTOLIC BLOOD PRESSURE: 112 MMHG | OXYGEN SATURATION: 100 %

## 2023-09-06 DIAGNOSIS — E04.0 SIMPLE GOITER: Primary | ICD-10-CM

## 2023-09-06 PROCEDURE — 36415 COLL VENOUS BLD VENIPUNCTURE: CPT | Performed by: INTERNAL MEDICINE

## 2023-09-06 PROCEDURE — 99203 OFFICE O/P NEW LOW 30 MIN: CPT | Performed by: INTERNAL MEDICINE

## 2023-09-06 PROCEDURE — 1159F MED LIST DOCD IN RCRD: CPT | Performed by: INTERNAL MEDICINE

## 2023-09-06 PROCEDURE — 1160F RVW MEDS BY RX/DR IN RCRD: CPT | Performed by: INTERNAL MEDICINE

## 2023-09-06 NOTE — PROGRESS NOTES
"     Office Note      Date: 2023  Patient Name: Lorrie Plasencia  MRN: [id number]  : 1955    Chief Complaint   Patient presents with    Thyroid Problem       History of Present Illness:   Lorrie Plasencia is a 68 y.o. female who presents for Thyroid Problem  .   Patient is seen for a new patient evaluation    Years ago when she was a kid, she took thyroid medication for a goiter.  She has a long history of trouble swallowing.  It seems to be getting worse. Last fall, she  had a thyroid ultrasound which showed a small right sided thyroid nodule with a heterogeneous thyroid with hyperemia.  She missed 2 appointment so was unable to get in until today. She continues  to have trouble swallowing. Even sometimes on her own saliva.  Her tsh was about 4 last year.  She notes no swelling  in her neck.  She does not have painful  swallowing   She indicates she is bothered by reflux but does not take any medication  for it   Subjective      Patient was [place] where[place] .  Facial radiation exposure: No.  High iodine intake: No  Family hx of thyroid disease: No.    Review of Systems:   Review of Systems   Constitutional:  Positive for fatigue. Negative for unexpected weight change.   HENT:  Positive for trouble swallowing.    Eyes:  Negative for visual disturbance.   Respiratory:  Positive for choking.    Endocrine: Negative for cold intolerance and heat intolerance.   Psychiatric/Behavioral:  Negative for sleep disturbance.      The following portions of the patient's history were reviewed and updated as appropriate: allergies, current medications, past family history, past medical history, past social history, past surgical history, and problem list.    Objective     Visit Vitals  /69   Pulse 90   Ht 160 cm (63\")   Wt 73 kg (161 lb)   SpO2 100%   BMI 28.52 kg/m²           Physical Exam:  Physical Exam  Vitals reviewed.   Constitutional:       Appearance: Normal appearance.   HENT:      Head: Normocephalic and " atraumatic.   Eyes:      Extraocular Movements: Extraocular movements intact.   Neck:      Comments: Hard, indurated thyroid gland   Lymphadenopathy:      Cervical: No cervical adenopathy.   Neurological:      Mental Status: She is alert.   Psychiatric:         Mood and Affect: Mood normal.         Behavior: Behavior normal.         Thought Content: Thought content normal.         Judgment: Judgment normal.       Assessment / Plan      Assessment & Plan:  Problem List Items Addressed This Visit          Other    Simple goiter - Primary    Current Assessment & Plan     She has a pretty substantial goiter. Clearly due to chronic thyroiditis based upon ultrasound appearance and exam. I am not sure this is the cause of the dysphagia. It might be contributing but it sure sounds like reflux is the more likely culprit.  I will check tpo and tsh and I anticipate starting her on levothyroxine to suppress the goiter and thyroid inflammation but she probably ought to have an egd at some point          Relevant Orders    TSH    Thyroid Peroxidase Antibody        Gregory Smith MD   09/06/2023

## 2023-09-06 NOTE — ASSESSMENT & PLAN NOTE
She has a pretty substantial goiter. Clearly due to chronic thyroiditis based upon ultrasound appearance and exam. I am not sure this is the cause of the dysphagia. It might be contributing but it sure sounds like reflux is the more likely culprit.  I will check tpo and tsh and I anticipate starting her on levothyroxine to suppress the goiter and thyroid inflammation but she probably ought to have an egd at some point

## 2023-09-07 LAB
THYROPEROXIDASE AB SERPL-ACNC: 51 IU/ML (ref 0–34)
TSH SERPL DL<=0.005 MIU/L-ACNC: 1.97 UIU/ML (ref 0.45–4.5)

## 2023-09-12 RX ORDER — LEVOTHYROXINE SODIUM 0.05 MG/1
50 TABLET ORAL DAILY
Qty: 30 TABLET | Refills: 11 | Status: SHIPPED | OUTPATIENT
Start: 2023-09-12 | End: 2024-09-11

## 2024-01-22 ENCOUNTER — HOSPITAL ENCOUNTER (OUTPATIENT)
Dept: CT IMAGING | Facility: HOSPITAL | Age: 69
Discharge: HOME | End: 2024-01-22
Admitting: PHYSICIAN ASSISTANT
Payer: MEDICARE

## 2024-01-22 DIAGNOSIS — R31.9 HEMATURIA, UNSPECIFIED TYPE: ICD-10-CM

## 2024-01-22 DIAGNOSIS — N28.89 LEFT RENAL MASS: ICD-10-CM

## 2024-01-22 PROCEDURE — 74170 CT ABD WO CNTRST FLWD CNTRST: CPT

## 2024-01-22 PROCEDURE — 25510000001 IOPAMIDOL 61 % SOLUTION: Performed by: PHYSICIAN ASSISTANT

## 2024-01-22 RX ADMIN — IOPAMIDOL 100 ML: 612 INJECTION, SOLUTION INTRAVENOUS at 17:55

## 2024-02-19 ENCOUNTER — OFFICE VISIT (OUTPATIENT)
Dept: UROLOGY | Facility: CLINIC | Age: 69
End: 2024-02-19
Payer: MEDICARE

## 2024-02-19 VITALS
HEIGHT: 63 IN | TEMPERATURE: 98.1 F | RESPIRATION RATE: 17 BRPM | OXYGEN SATURATION: 98 % | HEART RATE: 68 BPM | DIASTOLIC BLOOD PRESSURE: 60 MMHG | BODY MASS INDEX: 28.17 KG/M2 | SYSTOLIC BLOOD PRESSURE: 118 MMHG | WEIGHT: 159 LBS

## 2024-02-19 DIAGNOSIS — N28.1 RENAL CYST: Primary | ICD-10-CM

## 2024-02-19 PROCEDURE — 99213 OFFICE O/P EST LOW 20 MIN: CPT | Performed by: UROLOGY

## 2024-02-19 NOTE — PROGRESS NOTES
"Chief Complaint   Patient presents with    Nephrolithiasis     6 month f/u        HPI  Ms. Plasencia is a 68 y.o. female with history of nephrolithiasis who presents for follow up.     At this visit, has had no episodes of gross hematuria.  She presents to discuss her CT scan results.    Past Medical History:   Diagnosis Date    COVID-19 vaccine series completed     moderna    DVT (deep venous thrombosis)     left leg    GERD (gastroesophageal reflux disease)     Goiter     History of transfusion     No reaction    Kidney stones     Melanoma     Wears glasses        Past Surgical History:   Procedure Laterality Date    COLONOSCOPY      CYSTOSCOPY, RETROGRADE PYELOGRAM AND STENT INSERTION Left 3/16/2022    Procedure: CYSTOSCOPY RETROGRADE PYELOGRAM AND STENT INSERTION;  Surgeon: Jeremy Mccall MD;  Location: [facility];  Service: Urology;  Laterality: Left;    SKIN CANCER EXCISION      TUBAL ABDOMINAL LIGATION      URETERAL STENT INSERTION      URETEROSCOPY LASER LITHOTRIPSY WITH STENT INSERTION Left 3/23/2022    Procedure: URETEROSCOPY LEFT, CYSTOSCOPY, LEFT RETROGRADE PYELOGRAM, LASER LITHOTRIPSY WITH STENT EXCHANGE LEFT;  Surgeon: Jeremy Mccall MD;  Location: [facility];  Service: Urology;  Laterality: Left;    WISDOM TOOTH EXTRACTION           Current Outpatient Medications:     levothyroxine (Synthroid) 50 MCG tablet, Take 1 tablet by mouth Daily. (Patient not taking: Reported on 2/19/2024), Disp: 30 tablet, Rfl: 11     Physical Exam  Visit Vitals  /60 (BP Location: Right arm, Patient Position: Sitting, Cuff Size: Adult)   Pulse 68   Temp 98.1 °F (36.7 °C) (Temporal)   Resp 17   Ht 160 cm (63\")   Wt 72.1 kg (159 lb)   SpO2 98%   BMI 28.17 kg/m²       Labs  Brief Urine Lab Results  (Last result in the past 365 days)        Color   Clarity   Blood   Leuk Est   Nitrite   Protein   CREAT   Urine HCG        06/28/23 1136 Yellow   Clear   1+   Negative   Negative   Trace                   Lab Results "   Component Value Date    GLUCOSE 105 (H) 02/05/2022    CALCIUM 9.6 02/05/2022     02/05/2022    K 4.0 02/05/2022    CO2 25.4 02/05/2022    CL 99 02/05/2022    BUN 12 02/05/2022    CREATININE 0.98 02/05/2022    EGFRIFAFRI 69 02/05/2022    EGFRIFNONA 57 (L) 02/05/2022    BCR 12.2 02/05/2022    ANIONGAP 12.6 02/05/2022       Lab Results   Component Value Date    WBC 11.1 (H) 10/11/2022    HGB 14.5 10/11/2022    HCT 44.8 10/11/2022    MCV 86.2 10/11/2022     10/11/2022              Radiographic Studies  CT Abdomen Pelvis With & Without Contrast  Result Date: 5/2/2023  Vascular calcification right kidney stable from prior exam.  14 mm minimally enhancing lobulated left renal mass. Similar to previous noncontrast study, recommend 6 month follow-up.  Normal appearance of the entire right ureter with normal appearance of the visualized portion of the left ureter.  This report was signed and finalized on 5/2/2023 2:47 PM by Kandice Reece MD.    Narrative & Impression   PROCEDURE: CT ABDOMEN W WO CONTRAST-     HISTORY: CT Renal Mass Protocol; R31.9-Hematuria, unspecified;  N28.89-Other specified disorders of kidney and ureter     COMPARISON: May 2, 2023..     PROCEDURE: The patient was injected with IV contrast. Axial images were  obtained from the lung bases to the iliac crest by computed tomography.  This study was performed with techniques to keep radiation doses as low  as reasonably achievable, (ALARA). Individualized dose reduction  techniques using automated exposure control or adjustment of mA and/or  kV according to the patient size were employed.     FINDINGS:     ABDOMEN: The lung bases are clear. The heart is proper size. The liver  demonstrates diffuse fatty infiltration and a stable 6 mm hypodense  lesion adjacent to the gallbladder, likely a cyst. No CT visible  gallstones identified. As seen on the prior exam there is a  calcification in the right renal pelvis, possibly vascular. Aortic  mural  calcifications are noted. No calcification seen in the left kidney. The  spleen is unremarkable. No adrenal mass is present.  The pancreas is  unremarkable. The kidneys demonstrate normal appearance to the right  kidney except for a single calcification. Again noted in the inferior  pole left kidney is a circumscribed, minimally lobulated, hypodense 14  mm cystic lesion that demonstrates borderline enhancement; this is  stable from the prior exam. Recommend 1 year follow-up in June 2024 to  document stability. The aorta is proper caliber. There is no free fluid  or adenopathy.     IMPRESSION:  Stable 14 mm left inferior pole cystic lesion as described,  recommend 1 year follow-up in June 2024.     Stable 6 mm hepatic lesion, likely cyst. Recommend continued follow-up.     CTDI:  DLP:628.3 mGy.cm     This report was signed and finalized on 1/23/2024 7:43 AM by Kandice Reece MD.       I have reviewed the above labs and imaging.     Assessment  68 y.o. female with history of nephrolithiasis and left lower pole cystic renal lesion.  No actual red blood cells seen on urine microscopy.  The left lower pole Bosniak III renal cyst is stable.      Plan  1.  Follow-up in 1 year with repeat CT renal mass protocol

## 2024-08-15 ENCOUNTER — HOSPITAL ENCOUNTER (OUTPATIENT)
Dept: CT IMAGING | Facility: HOSPITAL | Age: 69
Discharge: HOME | End: 2024-08-15
Admitting: UROLOGY
Payer: MEDICARE

## 2024-08-15 DIAGNOSIS — N28.1 RENAL CYST: ICD-10-CM

## 2024-08-15 PROCEDURE — 74170 CT ABD WO CNTRST FLWD CNTRST: CPT

## 2024-08-15 PROCEDURE — 25510000001 IOPAMIDOL PER 1 ML: Performed by: UROLOGY

## 2024-08-15 RX ADMIN — IOPAMIDOL 100 ML: 755 INJECTION, SOLUTION INTRAVENOUS at 11:41

## 2024-08-19 ENCOUNTER — OFFICE VISIT (OUTPATIENT)
Dept: UROLOGY | Facility: CLINIC | Age: 69
End: 2024-08-19
Payer: MEDICARE

## 2024-08-19 VITALS
BODY MASS INDEX: 28.17 KG/M2 | HEART RATE: 68 BPM | WEIGHT: 159 LBS | HEIGHT: 63 IN | DIASTOLIC BLOOD PRESSURE: 78 MMHG | OXYGEN SATURATION: 96 % | SYSTOLIC BLOOD PRESSURE: 142 MMHG

## 2024-08-19 DIAGNOSIS — N39.41 URGE INCONTINENCE: ICD-10-CM

## 2024-08-19 DIAGNOSIS — N28.1 RENAL CYST: Primary | ICD-10-CM

## 2024-08-19 PROCEDURE — 1159F MED LIST DOCD IN RCRD: CPT | Performed by: UROLOGY

## 2024-08-19 PROCEDURE — 99213 OFFICE O/P EST LOW 20 MIN: CPT | Performed by: UROLOGY

## 2024-08-19 PROCEDURE — 1160F RVW MEDS BY RX/DR IN RCRD: CPT | Performed by: UROLOGY

## 2024-08-19 NOTE — PROGRESS NOTES
"Chief Complaint   Patient presents with    Renal cyst     6 month fu        HPI  Ms. Plasencia is a 69 y.o. female with history of nephrolithiasis who presents for follow up.     At this visit, has had no episodes of gross hematuria.  She presents to discuss her CT scan results.    Past Medical History:   Diagnosis Date    COVID-19 vaccine series completed     moderna    DVT (deep venous thrombosis)     left leg    GERD (gastroesophageal reflux disease)     Goiter     History of transfusion     No reaction    Kidney stones     Melanoma     Wears glasses        Past Surgical History:   Procedure Laterality Date    COLONOSCOPY      CYSTOSCOPY, RETROGRADE PYELOGRAM AND STENT INSERTION Left 3/16/2022    Procedure: CYSTOSCOPY RETROGRADE PYELOGRAM AND STENT INSERTION;  Surgeon: Jeremy Mccall MD;  Location: [facility];  Service: Urology;  Laterality: Left;    SKIN CANCER EXCISION      TUBAL ABDOMINAL LIGATION      URETERAL STENT INSERTION      URETEROSCOPY LASER LITHOTRIPSY WITH STENT INSERTION Left 3/23/2022    Procedure: URETEROSCOPY LEFT, CYSTOSCOPY, LEFT RETROGRADE PYELOGRAM, LASER LITHOTRIPSY WITH STENT EXCHANGE LEFT;  Surgeon: Jeremy Mccall MD;  Location: [facility];  Service: Urology;  Laterality: Left;    WISDOM TOOTH EXTRACTION           Current Outpatient Medications:     levothyroxine (Synthroid) 50 MCG tablet, Take 1 tablet by mouth Daily. (Patient not taking: Reported on 2/19/2024), Disp: 30 tablet, Rfl: 11     Physical Exam  Visit Vitals  /78 (BP Location: Left arm, Patient Position: Sitting, Cuff Size: Adult)   Pulse 68   Ht 160 cm (63\")   Wt 72.1 kg (159 lb)   SpO2 96%   BMI 28.17 kg/m²       Labs  Brief Urine Lab Results       None            Lab Results   Component Value Date    GLUCOSE 105 (H) 02/05/2022    CALCIUM 9.6 02/05/2022     02/05/2022    K 4.0 02/05/2022    CO2 25.4 02/05/2022    CL 99 02/05/2022    BUN 12 02/05/2022    CREATININE 0.98 02/05/2022    EGFRIFAFRI 69 " 02/05/2022    EGFRIFNONA 57 (L) 02/05/2022    BCR 12.2 02/05/2022    ANIONGAP 12.6 02/05/2022       Lab Results   Component Value Date    WBC 11.1 (H) 10/11/2022    HGB 14.5 10/11/2022    HCT 44.8 10/11/2022    MCV 86.2 10/11/2022     10/11/2022              Radiographic Studies  CT Abdomen With & Without Contrast  Result Date: 8/19/2024  Impression: 1. There is a 1.6 cm lobulated cystic density lesion in the left kidney which does not demonstrate enhancement on the current study. This may be due to differences in technique/thinner slice selection on the current exam as compared to prior. 2. Hepatic steatosis. Electronically Signed: Kym Castellon MD  8/19/2024 8:44 AM EDT  Workstation ID: [id number]       I have reviewed the above labs and imaging.     Assessment  69 y.o. female with history of nephrolithiasis and left lower pole cystic renal lesion.  No actual red blood cells seen on urine microscopy.  The left lower pole Bosniak III renal cyst is stable.  Today she admits to urge incontinence, but does not want any treatment for it.  I offered her medications, Botox, or InterStim.    Plan  1.  Follow-up in 1 year with repeat CT renal mass protocol

## 2024-10-05 ENCOUNTER — HOSPITAL ENCOUNTER (EMERGENCY)
Facility: HOSPITAL | Age: 69
Discharge: HOME | End: 2024-10-05
Attending: STUDENT IN AN ORGANIZED HEALTH CARE EDUCATION/TRAINING PROGRAM
Payer: MEDICARE

## 2024-10-05 ENCOUNTER — APPOINTMENT (OUTPATIENT)
Dept: GENERAL RADIOLOGY | Facility: HOSPITAL | Age: 69
End: 2024-10-05
Payer: MEDICARE

## 2024-10-05 VITALS
DIASTOLIC BLOOD PRESSURE: 71 MMHG | RESPIRATION RATE: 18 BRPM | HEIGHT: 63 IN | TEMPERATURE: 98 F | OXYGEN SATURATION: 98 % | BODY MASS INDEX: 28.53 KG/M2 | WEIGHT: 161 LBS | SYSTOLIC BLOOD PRESSURE: 167 MMHG | HEART RATE: 70 BPM

## 2024-10-05 DIAGNOSIS — J40 BRONCHITIS: Primary | ICD-10-CM

## 2024-10-05 LAB
FLUAV RNA RESP QL NAA+PROBE: NOT DETECTED
FLUBV RNA RESP QL NAA+PROBE: NOT DETECTED
S PYO AG THROAT QL: NEGATIVE
SARS-COV-2 RNA RESP QL NAA+PROBE: NOT DETECTED

## 2024-10-05 PROCEDURE — 99283 EMERGENCY DEPT VISIT LOW MDM: CPT

## 2024-10-05 PROCEDURE — 87081 CULTURE SCREEN ONLY: CPT

## 2024-10-05 PROCEDURE — 87880 STREP A ASSAY W/OPTIC: CPT

## 2024-10-05 PROCEDURE — 25010000002 DEXAMETHASONE PER 1 MG

## 2024-10-05 PROCEDURE — 87636 SARSCOV2 & INF A&B AMP PRB: CPT

## 2024-10-05 PROCEDURE — 71045 X-RAY EXAM CHEST 1 VIEW: CPT

## 2024-10-05 RX ORDER — ALBUTEROL SULFATE 90 UG/1
2 INHALANT RESPIRATORY (INHALATION) ONCE
Status: COMPLETED | OUTPATIENT
Start: 2024-10-05 | End: 2024-10-05

## 2024-10-05 RX ORDER — ALBUTEROL SULFATE 90 UG/1
2 INHALANT RESPIRATORY (INHALATION) EVERY 4 HOURS PRN
Qty: 8 G | Refills: 0 | Status: SHIPPED | OUTPATIENT
Start: 2024-10-05

## 2024-10-05 RX ORDER — ALBUTEROL SULFATE 90 UG/1
2 INHALANT RESPIRATORY (INHALATION) EVERY 4 HOURS PRN
Qty: 8 G | Refills: 0 | Status: SHIPPED | OUTPATIENT
Start: 2024-10-05 | End: 2024-10-05

## 2024-10-05 RX ADMIN — DEXAMETHASONE SODIUM PHOSPHATE 10 MG: 10 INJECTION INTRAMUSCULAR; INTRAVENOUS at 16:56

## 2024-10-05 RX ADMIN — ALBUTEROL SULFATE 2 PUFF: 90 AEROSOL, METERED RESPIRATORY (INHALATION) at 17:49

## 2024-10-05 NOTE — DISCHARGE INSTRUCTIONS
Follow-up with primary care physician.  I have sent an inhaler if needed for wheezing or shortness of breath.  If you develop worsening symptoms please return for reevaluation.  Bronchitis can unfortunately take several weeks to get over.

## 2024-10-05 NOTE — ED PROVIDER NOTES
Subjective  History of Present Illness:    This is a 69-year-old female presenting today for evaluation of cough and sore throat.  Has been ongoing for 1 week, reports hoarse voice, denies any shortness of air or chest pain, cough is productive of phlegm.  No fevers, no known sick exposures.  She does have a history of COVID-19 and DVT.  Denies any unilateral leg pain or swelling or hemoptysis.  She reports that she was given a steroid shot, allergy medicine, Tessalon Perles that have not seemed to help much, was given a Z-Jerson but did not take it.  She denies a history of smoking.  Additionally complaining of sore throat but no difficulty in swallowing.      Nurses Notes reviewed and agree, including vitals, allergies, social history and prior medical history.     REVIEW OF SYSTEMS: All systems reviewed and not pertinent unless noted.  Review of Systems   Constitutional:  Negative for chills and fever.   HENT:  Positive for sore throat.    Respiratory:  Positive for cough. Negative for shortness of breath and wheezing.    Cardiovascular:  Negative for chest pain, palpitations and leg swelling.   Gastrointestinal:  Negative for abdominal pain, nausea and vomiting.   All other systems reviewed and are negative.      Past Medical History:   Diagnosis Date    COVID-19 vaccine series completed     moderna    DVT (deep venous thrombosis)     left leg    GERD (gastroesophageal reflux disease)     Goiter     History of transfusion     No reaction    Kidney stones     Melanoma     Wears glasses        Allergies:    Amoxicillin, Doxycycline, Nitrofurantoin, Penicillins, and Sulfamethazine      Past Surgical History:   Procedure Laterality Date    COLONOSCOPY      CYSTOSCOPY, RETROGRADE PYELOGRAM AND STENT INSERTION Left 3/16/2022    Procedure: CYSTOSCOPY RETROGRADE PYELOGRAM AND STENT INSERTION;  Surgeon: Jeremy Mccall MD;  Location: [facility];  Service: Urology;  Laterality: Left;    SKIN CANCER EXCISION      TUBAL  "ABDOMINAL LIGATION      URETERAL STENT INSERTION      URETEROSCOPY LASER LITHOTRIPSY WITH STENT INSERTION Left 3/23/2022    Procedure: URETEROSCOPY LEFT, CYSTOSCOPY, LEFT RETROGRADE PYELOGRAM, LASER LITHOTRIPSY WITH STENT EXCHANGE LEFT;  Surgeon: Jeremy Mccall MD;  Location: [facility];  Service: Urology;  Laterality: Left;    WISDOM TOOTH EXTRACTION           Social History     Socioeconomic History    Marital status:    Tobacco Use    Smoking status: Former     Current packs/day: 0.00     Average packs/day: 0.5 packs/day for 22.0 years (11.0 ttl pk-yrs)     Types: Cigarettes     Start date:      Quit date:      Years since quittin.7     Passive exposure: Past    Smokeless tobacco: Never   Vaping Use    Vaping status: Never Used   Substance and Sexual Activity    Alcohol use: Never    Drug use: Never    Sexual activity: Defer         Family History   Problem Relation Age of Onset    Melanoma Son        Objective  Physical Exam:  /80 (BP Location: Left arm, Patient Position: Sitting)   Pulse 68   Temp 98.1 °F (36.7 °C) (Oral)   Resp 17   Ht 160 cm (63\")   Wt 73 kg (161 lb)   SpO2 97%   BMI 28.52 kg/m²      Physical Exam  Vitals and nursing note reviewed.   Constitutional:       General: She is not in acute distress.     Appearance: She is well-developed and normal weight. She is not ill-appearing, toxic-appearing or diaphoretic.   HENT:      Head: Normocephalic and atraumatic.      Nose: No congestion.      Mouth/Throat:      Mouth: Mucous membranes are moist.      Pharynx: Oropharynx is clear.      Tonsils: No tonsillar exudate or tonsillar abscesses. 0 on the right. 0 on the left.   Eyes:      Conjunctiva/sclera: Conjunctivae normal.   Cardiovascular:      Rate and Rhythm: Normal rate and regular rhythm.      Heart sounds: Normal heart sounds.   Pulmonary:      Effort: Pulmonary effort is normal. No respiratory distress.      Breath sounds: Normal breath sounds. No stridor. " No wheezing, rhonchi or rales.   Abdominal:      Palpations: Abdomen is soft.   Musculoskeletal:      Cervical back: Normal range of motion.   Skin:     General: Skin is warm and dry.      Capillary Refill: Capillary refill takes less than 2 seconds.   Neurological:      General: No focal deficit present.      Mental Status: She is alert and oriented to person, place, and time.   Psychiatric:         Mood and Affect: Mood normal.         Behavior: Behavior normal.               Procedures    ED Course:    ED Course as of 10/05/24 1736   Sat Oct 05, 2024   1714 Strep A Ag: Negative [JR]   1714 COVID-19 and FLU A/B PCR, 1 HR TAT - Swab, Nasopharynx [JR]   1714 Chest x-ray per my independent interpretation prior to radiology overread no acute process. [JR]      ED Course User Index  [JR] Sedrick French PA-C       Lab Results (last 24 hours)       Procedure Component Value Units Date/Time    COVID-19 and FLU A/B PCR, 1 HR TAT - Swab, Nasopharynx [[id number]]  (Normal) Collected: 10/05/24 1611    Specimen: Swab from Nasopharynx Updated: 10/05/24 1634     COVID19 Not Detected     Influenza A PCR Not Detected     Influenza B PCR Not Detected    Narrative:      Fact sheet for providers: https://www.fda.gov/media/564024/download    Fact sheet for patients: https://www.fda.gov/media/697096/download    Test performed by PCR.    Rapid Strep A Screen - Swab, Throat [[id number]]  (Normal) Collected: 10/05/24 1611    Specimen: Swab from Throat Updated: 10/05/24 1647     Strep A Ag Negative    Beta Strep Culture, Throat - Swab, Throat [[id number]] Collected: 10/05/24 1611    Specimen: Swab from Throat Updated: 10/05/24 1647             XR Chest 1 View    Result Date: 10/5/2024  FINAL REPORT TECHNIQUE: null CLINICAL HISTORY: cough and sore throat x 1 week COMPARISON: null FINDINGS: 1 view chest x-ray Comparison: None Findings: No consolidation or effusion. Heart size is normal. No acute fracture.     Impression: IMPRESSION: 1. No  acute findings. Authenticated and Electronically Signed by Raghu Gan on 10/05/2024 05:33:32 PM        [facility]     Amount and/or Complexity of Data Reviewed  Clinical lab tests: reviewed        Initial impression of presenting illness: This is a 69-year-old female presenting for evaluation of cough sore throat onset 1 week ago, cough productive of mild phlegm, no shortness of air no chest pain    DDX: includes but is not limited to: Bronchitis, viral URI, pneumonia, strep pharyngitis, tonsillitis, uvulitis, others    Patient arrives hemodynamically stable afebrile nontachycardic nontachypneic nonhypoxic with vitals interpreted by myself.     Pertinent features from physical exam: Oropharynx is clear, mild posterior oropharynx erythema, tonsils without exudate, no evidence of peritonsillar abscess uvula midline nondeviated, tolerating oral secretions without difficulty.  Abdomen soft nontender, lungs clear throughout, cardiac auscultation regular and rhythm.  Overall well-appearing.    Initial diagnostic plan: COVID flu and rapid strep as well as a chest x-ray    Results from initial plan were reviewed and interpreted by me revealing COVID flu negative, chest x-ray my independent interpretation prior to radiology overread no acute process, radiology overread with no acute process    Diagnostic information from other sources: Record reviewed    Interventions / Re-evaluation: Given Decadron 10 mg p.o. stable for discharge.  Oxygen saturations appropriate    Results/clinical rationale were discussed with patient at bedside.  Suspect bronchitis given cough x 1 week with no evidence of pneumonia.  Her COVID flu and strep testing were negative.  Suspect viral process.  She was given a long-acting steroid here in the emergency department.  Continue supportive care at home.    Consultations/Discussion of results with other physicians: Discussed with ED attending physician    Disposition plan: Discharge.  Suspect bronchitis.   Continue supportive care.  Follow-up with primary care physician.  -----    Final diagnoses:   Bronchitis          Sedrick French PA-C  10/05/24 3765

## 2024-10-08 LAB — BACTERIA SPEC AEROBE CULT: NORMAL

## 2025-04-08 ENCOUNTER — HOSPITAL ENCOUNTER (OUTPATIENT)
Facility: HOSPITAL | Age: 70
Discharge: HOME | End: 2025-04-08
Payer: MEDICARE

## 2025-04-08 ENCOUNTER — OFFICE VISIT (OUTPATIENT)
Age: 70
End: 2025-04-08
Payer: MEDICARE

## 2025-04-08 VITALS
HEIGHT: 63 IN | HEART RATE: 79 BPM | OXYGEN SATURATION: 97 % | RESPIRATION RATE: 18 BRPM | TEMPERATURE: 97.1 F | DIASTOLIC BLOOD PRESSURE: 78 MMHG | SYSTOLIC BLOOD PRESSURE: 138 MMHG | BODY MASS INDEX: 28.9 KG/M2 | WEIGHT: 163.1 LBS

## 2025-04-08 DIAGNOSIS — S10.96XA TICK BITE OF NECK, INITIAL ENCOUNTER: Primary | ICD-10-CM
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
## 2025-04-08 DIAGNOSIS — Z13.220 SCREENING CHOLESTEROL LEVEL: ICD-10-CM

## 2025-04-08 DIAGNOSIS — J30.89 ALLERGIC RHINITIS DUE TO OTHER ALLERGIC TRIGGER, UNSPECIFIED SEASONALITY: ICD-10-CM

## 2025-04-08 DIAGNOSIS — R68.83 CHILLS: ICD-10-CM

## 2025-04-08 DIAGNOSIS — E55.9 VITAMIN D DEFICIENCY: ICD-10-CM

## 2025-04-08 DIAGNOSIS — J01.00 ACUTE NON-RECURRENT MAXILLARY SINUSITIS: ICD-10-CM

## 2025-04-08 DIAGNOSIS — S10.96XA TICK BITE OF NECK, INITIAL ENCOUNTER: ICD-10-CM

## 2025-04-08 DIAGNOSIS — R53.83 OTHER FATIGUE: ICD-10-CM
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
## 2025-04-08 DIAGNOSIS — R05.1 ACUTE COUGH: ICD-10-CM

## 2025-04-08 LAB
25(OH)D3 SERPL-MCNC: 23.2 NG/ML (ref 32–100)
ALBUMIN SERPL-MCNC: 4.2 G/DL (ref 3.4–4.8)
ALBUMIN/GLOB SERPL: 1.4 {RATIO} (ref 0.8–2)
ALP SERPL-CCNC: 68 U/L (ref 25–100)
ALT SERPL-CCNC: 17 U/L (ref 4–36)
ANION GAP SERPL CALCULATED.3IONS-SCNC: 12 MMOL/L (ref 3–16)
AST SERPL-CCNC: 23 U/L (ref 8–33)
BASOPHILS # BLD: 0.1 K/UL (ref 0–0.1)
BASOPHILS NFR BLD: 0.9 %
BILIRUB SERPL-MCNC: 0.4 MG/DL (ref 0.3–1.2)
BUN SERPL-MCNC: 11 MG/DL (ref 6–20)
CALCIUM SERPL-MCNC: 9.5 MG/DL (ref 8.3–10.6)
CHLORIDE SERPL-SCNC: 105 MMOL/L (ref 98–107)
CHOLEST SERPL-MCNC: 206 MG/DL (ref 0–200)
CO2 SERPL-SCNC: 24 MMOL/L (ref 20–30)
CREAT SERPL-MCNC: 0.9 MG/DL (ref 0.4–1.2)
EOSINOPHIL # BLD: 0.5 K/UL (ref 0–0.4)
EOSINOPHIL NFR BLD: 4.5 %
ERYTHROCYTE [DISTWIDTH] IN BLOOD BY AUTOMATED COUNT: 13.4 % (ref 11–16)
FOLATE SERPL-MCNC: 14.2 NG/ML
GFR SERPLBLD CREATININE-BSD FMLA CKD-EPI: 70 ML/MIN/{1.73_M2}
GLOBULIN SER CALC-MCNC: 2.9 G/DL
GLUCOSE SERPL-MCNC: 80 MG/DL (ref 74–106)
HCT VFR BLD AUTO: 44.3 % (ref 37–47)
HDLC SERPL-MCNC: 61 MG/DL (ref 40–60)
HGB BLD-MCNC: 14 G/DL (ref 11.5–16.5)
IMM GRANULOCYTES # BLD: 0 K/UL
IMM GRANULOCYTES NFR BLD: 0.3 % (ref 0–5)
LDLC SERPL CALC-MCNC: 118 MG/DL
LYMPHOCYTES # BLD: 2.7 K/UL (ref 1.5–4)
LYMPHOCYTES NFR BLD: 27 %
MCH RBC QN AUTO: 28 PG (ref 27–32)
MCHC RBC AUTO-ENTMCNC: 31.6 G/DL (ref 31–35)
MCV RBC AUTO: 88.6 FL (ref 80–100)
MONOCYTES # BLD: 1 K/UL (ref 0.2–0.8)
MONOCYTES NFR BLD: 10.2 %
NEUTROPHILS # BLD: 5.8 K/UL (ref 2–7.5)
NEUTS SEG NFR BLD: 57.1 %
PLATELET # BLD AUTO: 326 K/UL (ref 150–400)
PMV BLD AUTO: 10.9 FL (ref 6–10)
POTASSIUM SERPL-SCNC: 5.1 MMOL/L (ref 3.4–5.1)
PROT SERPL-MCNC: 7.1 G/DL (ref 6.4–8.3)
RBC # BLD AUTO: 5 M/UL (ref 3.8–5.8)
SODIUM SERPL-SCNC: 141 MMOL/L (ref 136–145)
TRIGL SERPL-MCNC: 137 MG/DL (ref 0–249)
TSH SERPL DL<=0.005 MIU/L-ACNC: 6.09 UIU/ML (ref 0.27–4.2)
VIT B12 SERPL-MCNC: 264 PG/ML (ref 211–911)
VLDLC SERPL CALC-MCNC: 27 MG/DL
WBC # BLD AUTO: 10.1 K/UL (ref 4–11)

## 2025-04-08 PROCEDURE — 87636 SARSCOV2 & INF A&B AMP PRB: CPT | Performed by: NURSE PRACTITIONER

## 2025-04-08 PROCEDURE — 1123F ACP DISCUSS/DSCN MKR DOCD: CPT | Performed by: NURSE PRACTITIONER

## 2025-04-08 PROCEDURE — 1036F TOBACCO NON-USER: CPT | Performed by: NURSE PRACTITIONER

## 2025-04-08 PROCEDURE — 87880 STREP A ASSAY W/OPTIC: CPT | Performed by: NURSE PRACTITIONER

## 2025-04-08 PROCEDURE — 80053 COMPREHEN METABOLIC PANEL: CPT

## 2025-04-08 PROCEDURE — 86757 RICKETTSIA ANTIBODY: CPT

## 2025-04-08 PROCEDURE — 1090F PRES/ABSN URINE INCON ASSESS: CPT | Performed by: NURSE PRACTITIONER

## 2025-04-08 PROCEDURE — G8419 CALC BMI OUT NRM PARAM NOF/U: HCPCS | Performed by: NURSE PRACTITIONER

## 2025-04-08 PROCEDURE — 82607 VITAMIN B-12: CPT

## 2025-04-08 PROCEDURE — 80061 LIPID PANEL: CPT

## 2025-04-08 PROCEDURE — 82746 ASSAY OF FOLIC ACID SERUM: CPT

## 2025-04-08 PROCEDURE — 99214 OFFICE O/P EST MOD 30 MIN: CPT | Performed by: NURSE PRACTITIONER

## 2025-04-08 PROCEDURE — 82306 VITAMIN D 25 HYDROXY: CPT

## 2025-04-08 PROCEDURE — G8399 PT W/DXA RESULTS DOCUMENT: HCPCS | Performed by: NURSE PRACTITIONER

## 2025-04-08 PROCEDURE — 36415 COLL VENOUS BLD VENIPUNCTURE: CPT

## 2025-04-08 PROCEDURE — 86617 LYME DISEASE ANTIBODY: CPT

## 2025-04-08 PROCEDURE — 3017F COLORECTAL CA SCREEN DOC REV: CPT | Performed by: NURSE PRACTITIONER

## 2025-04-08 PROCEDURE — 84443 ASSAY THYROID STIM HORMONE: CPT

## 2025-04-08 PROCEDURE — G8427 DOCREV CUR MEDS BY ELIG CLIN: HCPCS | Performed by: NURSE PRACTITIONER

## 2025-04-08 PROCEDURE — 85025 COMPLETE CBC W/AUTO DIFF WBC: CPT

## 2025-04-08 RX ORDER — FLUTICASONE PROPIONATE 50 MCG
1 SPRAY, SUSPENSION (ML) NASAL DAILY
Qty: 16 G | Refills: 0 | Status: SHIPPED | OUTPATIENT
Start: 2025-04-08

## 2025-04-08 RX ORDER — CETIRIZINE HYDROCHLORIDE 10 MG/1
10 TABLET ORAL DAILY
Qty: 30 TABLET | Refills: 0 | Status: SHIPPED | OUTPATIENT
Start: 2025-04-08

## 2025-04-08 RX ORDER — AZITHROMYCIN 250 MG/1
TABLET, FILM COATED ORAL
Qty: 1 PACKET | Refills: 0 | Status: SHIPPED | OUTPATIENT
Start: 2025-04-08 | End: 2025-04-15 | Stop reason: ALTCHOICE

## 2025-04-08 NOTE — PROGRESS NOTES
SUBJECTIVE:    Katerine Christianson is a 69 y.o. female    Patient here reporting she has had ongoing sinus pressure for the last two days.  She complains of nasal congestion and maxillary sinus pressure.  No fever.  She reports she woke up at around 2 AM this morning shaking all over feeling nervous.  When she woke up she had complaints of a dry mouth/dry throat.  She does not take any medications.  She also reports she has frequent tick bites.  She reports she last removed a tick from her left knee 2 days ago.  She estimates that she has had approximately 6 ticks over the past few weeks.  She denies significant fever chills or bodyaches.  She has complaints of fatigue.  History of B12 deficiency and vitamin D deficiency.  Patient has not been taking B12 or vitamin D.  She was also prescribed levothyroxine by endocrinology due to thyroiditis.  Patient reports she did not start levothyroxine as directed by endocrinology. Denies hx of anxiet.                 Chief Complaint   Patient presents with    Nasal Congestion           Tick Bite        Review of Systems   Constitutional:  Positive for chills and fatigue. Negative for fever.   HENT:  Positive for postnasal drip, rhinorrhea and sinus pressure (maxillary).    Eyes: Negative.    Respiratory:  Negative for apnea.    Cardiovascular: Negative.    Gastrointestinal: Negative.    Endocrine: Negative.    Genitourinary: Negative.    Musculoskeletal: Negative.    Skin: Negative.  Negative for rash.        Approx 6 ticks removed over the past few weeks. No rash or skin lesions   Allergic/Immunologic: Negative.    Neurological: Negative.    Hematological: Negative.    Psychiatric/Behavioral: Negative.          OBJECTIVE:    /78   Pulse 79   Temp 97.1 °F (36.2 °C) (Infrared)   Resp 18   Ht 1.6 m (5' 3\")   Wt 74 kg (163 lb 1.6 oz)   LMP 01/01/2000 (Approximate)   SpO2 97% Comment: RA  BMI 28.89 kg/m²    Physical Exam  Vitals and nursing note reviewed.

## 2025-04-08 NOTE — PROGRESS NOTES
Chief Complaint   Patient presents with    Nasal Congestion           Tick Bite     Patient here reporting she has had ongoing sinus pressure for the last two days.   She states she woke up at 2AM and felt nervous and was shaking. She states she took a benadryl and went back to bed. She states she has removed approx 6 ticks off of her in the last few weeks. No known fever.

## 2025-04-09 ENCOUNTER — RESULTS FOLLOW-UP (OUTPATIENT)
Age: 70
End: 2025-04-09

## 2025-04-11 LAB
B BURGDOR IGG SER QL IB: NEGATIVE
B BURGDOR IGM SER QL IB: NEGATIVE

## 2025-04-13 LAB
R RICKETTSI IGG TITR SER IF: NORMAL {TITER}
R RICKETTSI IGM TITR SER IF: NORMAL {TITER}

## 2025-04-15 ENCOUNTER — OFFICE VISIT (OUTPATIENT)
Age: 70
End: 2025-04-15

## 2025-04-15 VITALS
DIASTOLIC BLOOD PRESSURE: 60 MMHG | HEIGHT: 63 IN | SYSTOLIC BLOOD PRESSURE: 118 MMHG | OXYGEN SATURATION: 96 % | BODY MASS INDEX: 28.92 KG/M2 | HEART RATE: 66 BPM | WEIGHT: 163.2 LBS | TEMPERATURE: 97 F | RESPIRATION RATE: 18 BRPM

## 2025-04-15 DIAGNOSIS — E03.9 HYPOTHYROIDISM, UNSPECIFIED TYPE: ICD-10-CM

## 2025-04-15 DIAGNOSIS — E53.8 B12 DEFICIENCY: ICD-10-CM

## 2025-04-15 DIAGNOSIS — R13.10 DYSPHAGIA, UNSPECIFIED TYPE: ICD-10-CM

## 2025-04-15 DIAGNOSIS — R05.1 ACUTE COUGH: ICD-10-CM

## 2025-04-15 DIAGNOSIS — J30.89 ALLERGIC RHINITIS DUE TO OTHER ALLERGIC TRIGGER, UNSPECIFIED SEASONALITY: ICD-10-CM

## 2025-04-15 DIAGNOSIS — E55.9 VITAMIN D DEFICIENCY: Primary | ICD-10-CM

## 2025-04-15 RX ORDER — LEVOTHYROXINE SODIUM 25 UG/1
25 TABLET ORAL DAILY
Qty: 90 TABLET | Refills: 1 | Status: SHIPPED | OUTPATIENT
Start: 2025-04-15

## 2025-04-15 RX ORDER — ERGOCALCIFEROL 1.25 MG/1
50000 CAPSULE, LIQUID FILLED ORAL WEEKLY
Qty: 4 CAPSULE | Refills: 5 | Status: SHIPPED | OUTPATIENT
Start: 2025-04-15

## 2025-04-15 RX ORDER — METHYLPREDNISOLONE ACETATE 80 MG/ML
120 INJECTION, SUSPENSION INTRA-ARTICULAR; INTRALESIONAL; INTRAMUSCULAR; SOFT TISSUE ONCE
Status: COMPLETED | OUTPATIENT
Start: 2025-04-15 | End: 2025-04-15

## 2025-04-15 RX ORDER — ALBUTEROL SULFATE 90 UG/1
2 INHALANT RESPIRATORY (INHALATION) EVERY 4 HOURS PRN
COMMUNITY
Start: 2024-10-05

## 2025-04-15 RX ORDER — CYANOCOBALAMIN 1000 UG/ML
1000 INJECTION, SOLUTION INTRAMUSCULAR; SUBCUTANEOUS ONCE
Status: COMPLETED | OUTPATIENT
Start: 2025-04-15 | End: 2025-04-15

## 2025-04-15 RX ORDER — OMEPRAZOLE 20 MG/1
20 CAPSULE, DELAYED RELEASE ORAL
Qty: 30 CAPSULE | Refills: 0 | Status: SHIPPED | OUTPATIENT
Start: 2025-04-15

## 2025-04-15 RX ADMIN — METHYLPREDNISOLONE ACETATE 80 MG: 80 INJECTION, SUSPENSION INTRA-ARTICULAR; INTRALESIONAL; INTRAMUSCULAR; SOFT TISSUE at 10:04

## 2025-04-15 RX ADMIN — CYANOCOBALAMIN 1000 MCG: 1000 INJECTION, SOLUTION INTRAMUSCULAR; SUBCUTANEOUS at 10:02

## 2025-04-15 SDOH — ECONOMIC STABILITY: FOOD INSECURITY: WITHIN THE PAST 12 MONTHS, YOU WORRIED THAT YOUR FOOD WOULD RUN OUT BEFORE YOU GOT MONEY TO BUY MORE.: NEVER TRUE

## 2025-04-15 SDOH — ECONOMIC STABILITY: FOOD INSECURITY: WITHIN THE PAST 12 MONTHS, THE FOOD YOU BOUGHT JUST DIDN'T LAST AND YOU DIDN'T HAVE MONEY TO GET MORE.: NEVER TRUE

## 2025-04-15 ASSESSMENT — ENCOUNTER SYMPTOMS
DIARRHEA: 0
CHOKING: 1
SINUS PRESSURE: 1
ALLERGIC/IMMUNOLOGIC NEGATIVE: 1
COUGH: 1
WHEEZING: 0
ABDOMINAL PAIN: 0
VOMITING: 0
RHINORRHEA: 1
EYES NEGATIVE: 1
VOICE CHANGE: 1
NAUSEA: 0
APNEA: 0
GASTROINTESTINAL NEGATIVE: 1
TROUBLE SWALLOWING: 1

## 2025-04-15 ASSESSMENT — PATIENT HEALTH QUESTIONNAIRE - PHQ9
2. FEELING DOWN, DEPRESSED OR HOPELESS: NOT AT ALL
SUM OF ALL RESPONSES TO PHQ QUESTIONS 1-9: 0
SUM OF ALL RESPONSES TO PHQ QUESTIONS 1-9: 0
1. LITTLE INTEREST OR PLEASURE IN DOING THINGS: NOT AT ALL
SUM OF ALL RESPONSES TO PHQ QUESTIONS 1-9: 0
SUM OF ALL RESPONSES TO PHQ QUESTIONS 1-9: 0

## 2025-04-15 NOTE — PROGRESS NOTES
Administrations This Visit       cyanocobalamin injection 1,000 mcg       Admin Date  04/15/2025  10:02 Action  Given Dose  1,000 mcg Route  IntraMUSCular Site  Deltoid Right Documented By  Susy Mandel MA    NDC: 11296-782-67    : [facility]    Patient Supplied?: No              methylPREDNISolone acetate (DEPO-MEDROL) injection 120 mg       Admin Date  04/15/2025  10:04 Action  Given Dose  80 mg Route  IntraMUSCular Site  Dorsogluteal Right Documented By  Susy Mandel MA    NDC: [address]-6    : [employer]    Patient Supplied?: No    Comments: Patient given 120mg of depo medrol                    Patient tolerated injection well. Patient advised to wait 20 minutes in the office following the injection. No signs/symptoms of reaction noted after 20 minutes.

## 2025-05-01 ENCOUNTER — LAB (OUTPATIENT)
Age: 70
End: 2025-05-01
Payer: MEDICARE

## 2025-05-01 DIAGNOSIS — E53.8 B12 DEFICIENCY: Primary | ICD-10-CM

## 2025-05-01 PROCEDURE — 96372 THER/PROPH/DIAG INJ SC/IM: CPT | Performed by: NURSE PRACTITIONER

## 2025-05-01 RX ORDER — CYANOCOBALAMIN 1000 UG/ML
1000 INJECTION, SOLUTION INTRAMUSCULAR; SUBCUTANEOUS ONCE
Status: COMPLETED | OUTPATIENT
Start: 2025-05-01 | End: 2025-05-01

## 2025-05-01 RX ADMIN — CYANOCOBALAMIN 1000 MCG: 1000 INJECTION, SOLUTION INTRAMUSCULAR; SUBCUTANEOUS at 15:07

## 2025-05-01 NOTE — PROGRESS NOTES
Administrations This Visit       cyanocobalamin injection 1,000 mcg       Admin Date  05/01/2025 Action  Given Dose  1,000 mcg Route  IntraMUSCular Documented By  Elli Melo, RN                  Patient tolerated injection well. Patient advised to wait 20 minutes in the office following the injection. No signs/symptoms of reaction noted after 20 minutes.

## 2025-05-13 NOTE — PROGRESS NOTES
Patient: Lorrie Plasencia    YOB: 1955    Date: 05/19/2025    Primary Care Provider: Kelly Pepe APRN    Chief Complaint   Patient presents with    Difficulty Swallowing       SUBJECTIVE:    History of present illness:  The patient is in the office today for evaluation and treatment of dysphagia.  Patient on Prilosec for reflux and occasional aspiration.  Difficult to swallow bread and hard meats.  Heartburn controlled with Prilosec.    The following portions of the patient's history were reviewed and updated as appropriate: allergies, current medications, past family history, past medical history, past social history, past surgical history and problem list.      Review of Systems   Constitutional:  Negative for chills, fever and unexpected weight change.   HENT:  Positive for trouble swallowing. Negative for hearing loss and voice change.    Eyes:  Negative for visual disturbance.   Respiratory:  Negative for apnea, cough, chest tightness, shortness of breath and wheezing.    Cardiovascular:  Negative for chest pain, palpitations and leg swelling.   Gastrointestinal:  Negative for abdominal distention, abdominal pain, anal bleeding, blood in stool, constipation, diarrhea, nausea, rectal pain and vomiting.   Endocrine: Negative for cold intolerance and heat intolerance.   Genitourinary:  Negative for difficulty urinating, dysuria and flank pain.   Musculoskeletal:  Negative for back pain and gait problem.   Skin:  Negative for color change, rash and wound.   Neurological:  Negative for dizziness, syncope, speech difficulty, weakness, light-headedness, numbness and headaches.   Hematological:  Negative for adenopathy. Does not bruise/bleed easily.   Psychiatric/Behavioral:  Negative for confusion. The patient is not nervous/anxious.          Allergies:  Allergies   Allergen Reactions    Amoxicillin Hives     Beta lactam allergy details  Antibiotic reaction: hives, nausea  Age at reaction:  adult  Dose to reaction time: days  Reason for antibiotic: unknown  Epinephrine required for reaction?: no  Tolerated antibiotics: unknown        Doxycycline Other (See Comments)     Blisters all over      Nitrofurantoin Hives    Penicillins Hives    Sulfamethazine Rash       Medications:    Current Outpatient Medications:     albuterol sulfate  (90 Base) MCG/ACT inhaler, Inhale 2 puffs Every 4 (Four) Hours As Needed for Wheezing or Shortness of Air., Disp: 8 g, Rfl: 0    cetirizine (zyrTEC) 10 MG tablet, Take 1 tablet by mouth Daily., Disp: , Rfl:     fluticasone (FLONASE) 50 MCG/ACT nasal spray, 1 spray., Disp: , Rfl:     levothyroxine (Synthroid) 50 MCG tablet, Take 1 tablet by mouth Daily., Disp: 30 tablet, Rfl: 11    omeprazole (priLOSEC) 20 MG capsule, Take 1 capsule by mouth., Disp: , Rfl:     vitamin D (ERGOCALCIFEROL) 1.25 MG (35413 UT) capsule capsule, Take 1 capsule by mouth 1 (One) Time Per Week., Disp: , Rfl:     History:  Past Medical History:   Diagnosis Date    COVID-19 vaccine series completed     moderna    DVT (deep venous thrombosis)     left leg    GERD (gastroesophageal reflux disease)     Goiter     History of transfusion     No reaction    Kidney stones     Melanoma     Wears glasses        Past Surgical History:   Procedure Laterality Date    COLONOSCOPY      CYSTOSCOPY, RETROGRADE PYELOGRAM AND STENT INSERTION Left 3/16/2022    Procedure: CYSTOSCOPY RETROGRADE PYELOGRAM AND STENT INSERTION;  Surgeon: Jeremy Mccall MD;  Location: [facility];  Service: Urology;  Laterality: Left;    SKIN CANCER EXCISION      TUBAL ABDOMINAL LIGATION      URETERAL STENT INSERTION      URETEROSCOPY LASER LITHOTRIPSY WITH STENT INSERTION Left 3/23/2022    Procedure: URETEROSCOPY LEFT, CYSTOSCOPY, LEFT RETROGRADE PYELOGRAM, LASER LITHOTRIPSY WITH STENT EXCHANGE LEFT;  Surgeon: Jeremy Mccall MD;  [facility] OR;  Service: Urology;  Laterality: Left;    WISDOM TOOTH EXTRACTION    "      Family History   Problem Relation Age of Onset    Melanoma Son        Social History     Tobacco Use    Smoking status: Former     Current packs/day: 0.00     Average packs/day: 0.5 packs/day for 22.0 years (11.0 ttl pk-yrs)     Types: Cigarettes     Start date:      Quit date:      Years since quittin.4     Passive exposure: Past    Smokeless tobacco: Never   Vaping Use    Vaping status: Never Used   Substance Use Topics    Alcohol use: Never    Drug use: Never        OBJECTIVE:    Vital Signs:   Vitals:    25 1306   BP: 132/68   Pulse: 73   Temp: 97.8 °F (36.6 °C)   TempSrc: Infrared   SpO2: 96%   Weight: 74.8 kg (165 lb)   Height: 160 cm (62.99\")       Physical Exam:       Lungs-clear  Heart-regular rate without murmur  Abdomen--Slightly tender epigastric region    Results Review:   I reviewed the patient's new clinical results.    Review of Systems was reviewed and confirmed as accurate as documented by the MA.    ASSESSMENT/PLAN:    1. Oral phase dysphagia    2. Gastroesophageal reflux disease, unspecified whether esophagitis present        Patient is scheduled for EGD with possible dilatation.  Risk of bleeding and perforation discussed and patient agreeable.  She had no further questions or subsequent proceed with the EGD and polyp dilatation.  Maintain a GI soft diet, continue Prilosec and avoid caffeine alcohol and nicotine.          Electronically signed by David Louise MD  25          "

## 2025-05-13 NOTE — H&P (VIEW-ONLY)
Patient: Lorrie Plasencia    YOB: 1955    Date: 05/19/2025    Primary Care Provider: Kelly Pepe APRN    Chief Complaint   Patient presents with    Difficulty Swallowing       SUBJECTIVE:    History of present illness:  The patient is in the office today for evaluation and treatment of dysphagia.  Patient on Prilosec for reflux and occasional aspiration.  Difficult to swallow bread and hard meats.  Heartburn controlled with Prilosec.    The following portions of the patient's history were reviewed and updated as appropriate: allergies, current medications, past family history, past medical history, past social history, past surgical history and problem list.      Review of Systems   Constitutional:  Negative for chills, fever and unexpected weight change.   HENT:  Positive for trouble swallowing. Negative for hearing loss and voice change.    Eyes:  Negative for visual disturbance.   Respiratory:  Negative for apnea, cough, chest tightness, shortness of breath and wheezing.    Cardiovascular:  Negative for chest pain, palpitations and leg swelling.   Gastrointestinal:  Negative for abdominal distention, abdominal pain, anal bleeding, blood in stool, constipation, diarrhea, nausea, rectal pain and vomiting.   Endocrine: Negative for cold intolerance and heat intolerance.   Genitourinary:  Negative for difficulty urinating, dysuria and flank pain.   Musculoskeletal:  Negative for back pain and gait problem.   Skin:  Negative for color change, rash and wound.   Neurological:  Negative for dizziness, syncope, speech difficulty, weakness, light-headedness, numbness and headaches.   Hematological:  Negative for adenopathy. Does not bruise/bleed easily.   Psychiatric/Behavioral:  Negative for confusion. The patient is not nervous/anxious.          Allergies:  Allergies   Allergen Reactions    Amoxicillin Hives     Beta lactam allergy details  Antibiotic reaction: hives, nausea  Age at reaction:  adult  Dose to reaction time: days  Reason for antibiotic: unknown  Epinephrine required for reaction?: no  Tolerated antibiotics: unknown        Doxycycline Other (See Comments)     Blisters all over      Nitrofurantoin Hives    Penicillins Hives    Sulfamethazine Rash       Medications:    Current Outpatient Medications:     albuterol sulfate  (90 Base) MCG/ACT inhaler, Inhale 2 puffs Every 4 (Four) Hours As Needed for Wheezing or Shortness of Air., Disp: 8 g, Rfl: 0    cetirizine (zyrTEC) 10 MG tablet, Take 1 tablet by mouth Daily., Disp: , Rfl:     fluticasone (FLONASE) 50 MCG/ACT nasal spray, 1 spray., Disp: , Rfl:     levothyroxine (Synthroid) 50 MCG tablet, Take 1 tablet by mouth Daily., Disp: 30 tablet, Rfl: 11    omeprazole (priLOSEC) 20 MG capsule, Take 1 capsule by mouth., Disp: , Rfl:     vitamin D (ERGOCALCIFEROL) 1.25 MG (66144 UT) capsule capsule, Take 1 capsule by mouth 1 (One) Time Per Week., Disp: , Rfl:     History:  Past Medical History:   Diagnosis Date    COVID-19 vaccine series completed     moderna    DVT (deep venous thrombosis)     left leg    GERD (gastroesophageal reflux disease)     Goiter     History of transfusion     No reaction    Kidney stones     Melanoma     Wears glasses        Past Surgical History:   Procedure Laterality Date    COLONOSCOPY      CYSTOSCOPY, RETROGRADE PYELOGRAM AND STENT INSERTION Left 3/16/2022    Procedure: CYSTOSCOPY RETROGRADE PYELOGRAM AND STENT INSERTION;  Surgeon: Jeremy Mccall MD;  Location: [facility];  Service: Urology;  Laterality: Left;    SKIN CANCER EXCISION      TUBAL ABDOMINAL LIGATION      URETERAL STENT INSERTION      URETEROSCOPY LASER LITHOTRIPSY WITH STENT INSERTION Left 3/23/2022    Procedure: URETEROSCOPY LEFT, CYSTOSCOPY, LEFT RETROGRADE PYELOGRAM, LASER LITHOTRIPSY WITH STENT EXCHANGE LEFT;  Surgeon: Jeremy Mccall MD;  Location: [facility] OR;  Service: Urology;  Laterality: Left;    WISDOM TOOTH EXTRACTION    "      Family History   Problem Relation Age of Onset    Melanoma Son        Social History     Tobacco Use    Smoking status: Former     Current packs/day: 0.00     Average packs/day: 0.5 packs/day for 22.0 years (11.0 ttl pk-yrs)     Types: Cigarettes     Start date:      Quit date:      Years since quittin.4     Passive exposure: Past    Smokeless tobacco: Never   Vaping Use    Vaping status: Never Used   Substance Use Topics    Alcohol use: Never    Drug use: Never        OBJECTIVE:    Vital Signs:   Vitals:    25 1306   BP: 132/68   Pulse: 73   Temp: 97.8 °F (36.6 °C)   TempSrc: Infrared   SpO2: 96%   Weight: 74.8 kg (165 lb)   Height: 160 cm (62.99\")       Physical Exam:       Lungs-clear  Heart-regular rate without murmur  Abdomen--Slightly tender epigastric region    Results Review:   I reviewed the patient's new clinical results.    Review of Systems was reviewed and confirmed as accurate as documented by the MA.    ASSESSMENT/PLAN:    1. Oral phase dysphagia    2. Gastroesophageal reflux disease, unspecified whether esophagitis present        Patient is scheduled for EGD with possible dilatation.  Risk of bleeding and perforation discussed and patient agreeable.  She had no further questions or subsequent proceed with the EGD and polyp dilatation.  Maintain a GI soft diet, continue Prilosec and avoid caffeine alcohol and nicotine.          Electronically signed by David Louise MD  25          "

## 2025-05-14 ENCOUNTER — LAB (OUTPATIENT)
Age: 70
End: 2025-05-14
Payer: MEDICARE

## 2025-05-14 DIAGNOSIS — E53.8 B12 DEFICIENCY: Primary | ICD-10-CM

## 2025-05-14 PROCEDURE — 96372 THER/PROPH/DIAG INJ SC/IM: CPT | Performed by: NURSE PRACTITIONER

## 2025-05-14 RX ORDER — CYANOCOBALAMIN 1000 UG/ML
1000 INJECTION, SOLUTION INTRAMUSCULAR; SUBCUTANEOUS ONCE
Status: COMPLETED | OUTPATIENT
Start: 2025-05-14 | End: 2025-05-14

## 2025-05-14 RX ADMIN — CYANOCOBALAMIN 1000 MCG: 1000 INJECTION, SOLUTION INTRAMUSCULAR; SUBCUTANEOUS at 11:30

## 2025-05-14 NOTE — PROGRESS NOTES
Administrations This Visit       cyanocobalamin injection 1,000 mcg       Admin Date  05/14/2025 Action  Given Dose  1,000 mcg Route  Intra[facility] Documented By  Elli Melo, RN                  Patient tolerated injection well. Patient advised to wait 20 minutes in the office following the injection. No signs/symptoms of reaction noted after 20 minutes.

## 2025-05-19 ENCOUNTER — OFFICE VISIT (OUTPATIENT)
Dept: SURGERY | Facility: CLINIC | Age: 70
End: 2025-05-19
Payer: MEDICARE

## 2025-05-19 ENCOUNTER — PATIENT ROUNDING (BHMG ONLY) (OUTPATIENT)
Dept: SURGERY | Facility: CLINIC | Age: 70
End: 2025-05-19
Payer: MEDICARE

## 2025-05-19 ENCOUNTER — PREP FOR SURGERY (OUTPATIENT)
Dept: OTHER | Facility: HOSPITAL | Age: 70
End: 2025-05-19
Payer: MEDICARE

## 2025-05-19 VITALS
SYSTOLIC BLOOD PRESSURE: 132 MMHG | OXYGEN SATURATION: 96 % | HEIGHT: 63 IN | BODY MASS INDEX: 29.23 KG/M2 | HEART RATE: 73 BPM | TEMPERATURE: 97.8 F | WEIGHT: 165 LBS | DIASTOLIC BLOOD PRESSURE: 68 MMHG

## 2025-05-19 DIAGNOSIS — K21.9 GASTROESOPHAGEAL REFLUX DISEASE, UNSPECIFIED WHETHER ESOPHAGITIS PRESENT: ICD-10-CM

## 2025-05-19 DIAGNOSIS — R13.11 ORAL PHASE DYSPHAGIA: Primary | ICD-10-CM

## 2025-05-19 PROCEDURE — 99203 OFFICE O/P NEW LOW 30 MIN: CPT | Performed by: SURGERY

## 2025-05-19 PROCEDURE — 1160F RVW MEDS BY RX/DR IN RCRD: CPT | Performed by: SURGERY

## 2025-05-19 PROCEDURE — 1159F MED LIST DOCD IN RCRD: CPT | Performed by: SURGERY

## 2025-05-19 RX ORDER — ERGOCALCIFEROL 1.25 MG/1
1 CAPSULE, LIQUID FILLED ORAL WEEKLY
COMMUNITY
Start: 2025-04-15

## 2025-05-19 RX ORDER — OMEPRAZOLE 20 MG/1
20 CAPSULE, DELAYED RELEASE ORAL
COMMUNITY
Start: 2025-04-15

## 2025-05-19 RX ORDER — CETIRIZINE HYDROCHLORIDE 10 MG/1
1 TABLET ORAL DAILY
COMMUNITY
Start: 2025-04-08

## 2025-05-19 RX ORDER — FLUTICASONE PROPIONATE 50 MCG
1 SPRAY, SUSPENSION (ML) NASAL
COMMUNITY
Start: 2025-04-08

## 2025-05-19 NOTE — PROGRESS NOTES
May 19, 2025    Hello, may I speak with Lorrie Plasencia?    My name is Genesis    I am  with [facility]  [address]  [place] [address] [phone].    Before we get started may I verify your date of birth? 1955    I am calling to officially welcome you to our practice and ask about your recent visit. Is this a good time to talk? yes    Tell me about your visit with us. What things went well?  Everything was good!       We're always looking for ways to make our patients' experiences even better. Do you have recommendations on ways we may improve?  no    Overall were you satisfied with your first visit to our practice? yes       I appreciate you taking the time to speak with me today. Is there anything else I can do for you? no      Thank you, and have a great day.

## 2025-05-21 ENCOUNTER — PREP FOR SURGERY (OUTPATIENT)
Dept: OTHER | Facility: HOSPITAL | Age: 70
End: 2025-05-21
Payer: MEDICARE

## 2025-05-21 DIAGNOSIS — R13.11 ORAL PHASE DYSPHAGIA: Primary | ICD-10-CM

## 2025-05-28 ENCOUNTER — OFFICE VISIT (OUTPATIENT)
Age: 70
End: 2025-05-28
Payer: MEDICARE

## 2025-05-28 ENCOUNTER — HOSPITAL ENCOUNTER (OUTPATIENT)
Facility: HOSPITAL | Age: 70
Discharge: HOME | End: 2025-05-28
Payer: MEDICARE

## 2025-05-28 VITALS
OXYGEN SATURATION: 97 % | TEMPERATURE: 97.4 F | WEIGHT: 165.6 LBS | RESPIRATION RATE: 18 BRPM | HEART RATE: 73 BPM | DIASTOLIC BLOOD PRESSURE: 68 MMHG | BODY MASS INDEX: 29.33 KG/M2 | SYSTOLIC BLOOD PRESSURE: 138 MMHG

## 2025-05-28 DIAGNOSIS — E06.3 HYPOTHYROIDISM DUE TO HASHIMOTO THYROIDITIS: ICD-10-CM

## 2025-05-28 DIAGNOSIS — E53.8 B12 DEFICIENCY: Primary | ICD-10-CM

## 2025-05-28 LAB — TSH SERPL DL<=0.005 MIU/L-ACNC: 3.38 UIU/ML (ref 0.27–4.2)

## 2025-05-28 PROCEDURE — 36415 COLL VENOUS BLD VENIPUNCTURE: CPT

## 2025-05-28 PROCEDURE — 3017F COLORECTAL CA SCREEN DOC REV: CPT | Performed by: NURSE PRACTITIONER

## 2025-05-28 PROCEDURE — 1036F TOBACCO NON-USER: CPT | Performed by: NURSE PRACTITIONER

## 2025-05-28 PROCEDURE — 1123F ACP DISCUSS/DSCN MKR DOCD: CPT | Performed by: NURSE PRACTITIONER

## 2025-05-28 PROCEDURE — 96372 THER/PROPH/DIAG INJ SC/IM: CPT | Performed by: NURSE PRACTITIONER

## 2025-05-28 PROCEDURE — 84443 ASSAY THYROID STIM HORMONE: CPT

## 2025-05-28 PROCEDURE — 1090F PRES/ABSN URINE INCON ASSESS: CPT | Performed by: NURSE PRACTITIONER

## 2025-05-28 PROCEDURE — G8419 CALC BMI OUT NRM PARAM NOF/U: HCPCS | Performed by: NURSE PRACTITIONER

## 2025-05-28 PROCEDURE — G8427 DOCREV CUR MEDS BY ELIG CLIN: HCPCS | Performed by: NURSE PRACTITIONER

## 2025-05-28 PROCEDURE — 1159F MED LIST DOCD IN RCRD: CPT | Performed by: NURSE PRACTITIONER

## 2025-05-28 PROCEDURE — G8399 PT W/DXA RESULTS DOCUMENT: HCPCS | Performed by: NURSE PRACTITIONER

## 2025-05-28 PROCEDURE — 99213 OFFICE O/P EST LOW 20 MIN: CPT | Performed by: NURSE PRACTITIONER

## 2025-05-28 RX ORDER — CYANOCOBALAMIN 1000 UG/ML
1000 INJECTION, SOLUTION INTRAMUSCULAR; SUBCUTANEOUS ONCE
Status: COMPLETED | OUTPATIENT
Start: 2025-05-28 | End: 2025-05-28

## 2025-05-28 RX ADMIN — CYANOCOBALAMIN 1000 MCG: 1000 INJECTION, SOLUTION INTRAMUSCULAR; SUBCUTANEOUS at 10:01

## 2025-05-28 ASSESSMENT — ENCOUNTER SYMPTOMS
VOMITING: 0
NAUSEA: 0
COUGH: 0
DIARRHEA: 0
ABDOMINAL PAIN: 0
SHORTNESS OF BREATH: 0

## 2025-05-28 NOTE — PROGRESS NOTES
SUBJECTIVE:    Katerine Christianson is a 70 y.o. female    HPI     Chief Complaint   Patient presents with    Hypothyroidism        Review of Systems     OBJECTIVE:    /68   Pulse 73   Temp 97.4 °F (36.3 °C) (Infrared)   Resp 18   Wt 75.1 kg (165 lb 9.6 oz)   LMP 01/01/2000 (Approximate)   SpO2 97% Comment: RA  BMI 29.33 kg/m²    Physical Exam    ASSESSMENT/PLAN:     Current Outpatient Medications on File Prior to Visit   Medication Sig Dispense Refill    albuterol sulfate HFA (PROVENTIL;VENTOLIN;PROAIR) 108 (90 Base) MCG/ACT inhaler Inhale 2 puffs into the lungs every 4 hours as needed      levothyroxine (SYNTHROID) 25 MCG tablet Take 1 tablet by mouth daily 90 tablet 1    vitamin D (ERGOCALCIFEROL) 1.25 MG (81220 UT) CAPS capsule Take 1 capsule by mouth once a week 4 capsule 5    omeprazole (PRILOSEC) 20 MG delayed release capsule Take 1 capsule by mouth every morning (before breakfast) 30 capsule 0    fluticasone (FLONASE) 50 MCG/ACT nasal spray 1 spray by Each Nostril route daily 16 g 0    cetirizine (ZYRTEC) 10 MG tablet Take 1 tablet by mouth daily 30 tablet 0    loratadine (CLARITIN) 10 MG tablet Take 1 tablet by mouth daily 30 tablet 5     No current facility-administered medications on file prior to visit.               EMR Dragon/transcription disclaimer:  Much of this encounter note is electronictranscription/translation of spoken language to printed texts.  The electronic translation of spoken language may be erroneous, or at times, nonsensical words or phrases may be inadvertently transcribed.  Although I havereviewed the note for such errors, some may still exist.

## 2025-05-28 NOTE — PROGRESS NOTES
Chief Complaint   Patient presents with    Hypothyroidism     Patient here for repeat TSH and B12 injection.    Have you seen any other physician or provider since your last visit yes - Dr Rohit guerra will have EGD June 16    Have you had any other diagnostic tests since your last visit? no    Have you changed or stopped any medications since your last visit? no

## 2025-05-28 NOTE — PROGRESS NOTES
SUBJECTIVE:    Katerine Christianson is a 70 y.o. female    History of Present Illness  The patient is a 70-year-old female who presents for a repeat TSH test. Her TSH level on 04/08/2025 was elevated at 6.09.    She was evaluated by endocrinology in 2022 for a subcentimeter thyroid nodule on the right and heterogeneous thyroid echotexture with increased hyperemia, which may be reflective of thyroiditis. At that time, her TSH was normal. Per endocrinology note, she was started on levothyroxine but initially did not take it due to a general aversion to medication. However, she began taking levothyroxine 25 mcg daily on 04/15/2025 and is here today for a repeat TSH test. She has been adhering to the prescribed regimen of levothyroxine, taking it first thing in the morning 30 minutes before eating. Since starting the medication, she has observed a slight improvement in her energy levels and fatigue.    She also has a past medical history of B12 deficiency. On 04/08/2025, her B12 level was 264, and she was started on B12 injections. She has received her fourth B12 injection today.     Chief Complaint   Patient presents with    Hypothyroidism        Review of Systems   Constitutional:  Positive for fatigue (has improved since starting levothyroxine 25 mcg daily and B12 injections.).   Respiratory:  Negative for cough and shortness of breath.    Cardiovascular:  Negative for chest pain.   Gastrointestinal:  Negative for abdominal pain, diarrhea, nausea and vomiting.        OBJECTIVE:    /68   Pulse 73   Temp 97.4 °F (36.3 °C) (Infrared)   Resp 18   Wt 75.1 kg (165 lb 9.6 oz)   LMP 01/01/2000 (Approximate)   SpO2 97% Comment: RA  BMI 29.33 kg/m²    Physical Exam  Results  Labs   - TSH: 04/08/2025, Elevated at 6.09   - B12 level: 04/08/2025, 264    ASSESSMENT/PLAN:   Assessment & Plan  1. Hypothyroidism.  Her TSH level was elevated at 6.09 on 04/08/2025.  She started levothyroxine 25 mcg daily on 04/15/2025 and

## 2025-05-29 ENCOUNTER — RESULTS FOLLOW-UP (OUTPATIENT)
Age: 70
End: 2025-05-29

## 2025-06-09 ENCOUNTER — HOSPITAL ENCOUNTER (OUTPATIENT)
Facility: HOSPITAL | Age: 70
Discharge: HOME | End: 2025-06-09
Payer: MEDICARE

## 2025-06-09 ENCOUNTER — HOSPITAL ENCOUNTER (OUTPATIENT)
Dept: GENERAL RADIOLOGY | Facility: HOSPITAL | Age: 70
Discharge: HOME | End: 2025-06-09
Payer: MEDICARE

## 2025-06-09 DIAGNOSIS — R05.1 ACUTE COUGH: ICD-10-CM

## 2025-06-09 PROCEDURE — 71046 X-RAY EXAM CHEST 2 VIEWS: CPT

## 2025-06-10 ENCOUNTER — RESULTS FOLLOW-UP (OUTPATIENT)
Age: 70
End: 2025-06-10

## 2025-06-10 NOTE — PRE-PROCEDURE INSTRUCTIONS
PAT phone history completed with patient for upcoming procedure on 6/16/25 with Dr. Louise.    PAT PASS reviewed with patient and they verbalize understanding of the following:     Do not eat or drink anything after midnight the night before procedure unless otherwise instructed by physician/surgeon's office, this includes no gum, candy, mints, tobacco products or e-cigarettes.  Do not shave the area to be operated on at least 48 hours prior to procedure.  Do not wear makeup, lotion, hair products, or nail polish.  Do not wear any jewelry and remove all piercings.  Do not wear any adhesive if you wear dentures.  Do not wear contacts; bring in glasses if needed.  Only take medications on the morning of procedure as instructed by PAT nurse per anesthesia guidelines or as instructed by physician's office.  If you are on any blood thinners reach out to the physician/surgeon's office for instructions on when/if they will need to be stopped prior to procedure.  Bring in picture ID and insurance card, advanced directive copies if applicable, CPAP/BIPAP/Inhalers if indicated morning of procedure, leave any other valuables at home.  Ensure you have arranged for someone to drive you home the day of your procedure and someone to care for you at home afterwards. It is recommended that you do not drive, drink alcohol, or make any major legal decisions for at least 24 hours after your procedure is complete.  ERAS instructions given unless otherwise instructed per surgeon's orders.    Instructions given on hospital entrance and registration location.

## 2025-06-15 ENCOUNTER — ANESTHESIA EVENT (OUTPATIENT)
Dept: GASTROENTEROLOGY | Facility: HOSPITAL | Age: 70
End: 2025-06-15
Payer: MEDICARE

## 2025-06-15 NOTE — ANESTHESIA PREPROCEDURE EVALUATION
Anesthesia Evaluation     Patient summary reviewed and Nursing notes reviewed   NPO Solid Status: > 8 hours  NPO Liquid Status: > 2 hours           Airway   Mallampati: II  TM distance: >3 FB  Neck ROM: full  No difficulty expected  Dental - normal exam     Pulmonary - normal exam   (+) a smoker (11 pack years quit 1989) Former, Abstained day of surgery, cigarettes,  Cardiovascular - normal exam    Rhythm: regular  Rate: normal    (+) DVT resolved      Neuro/Psych- negative ROS  GI/Hepatic/Renal/Endo    (+) GERD well controlled, renal disease- stones, thyroid problem thyroid nodules    ROS Comment: dysphagia    Musculoskeletal     Abdominal  - normal exam   Substance History - negative use     OB/GYN negative ob/gyn ROS         Other   arthritis,   history of cancer (melanoma arm) remission                  Anesthesia Plan    ASA 3     MAC   total IV anesthesia  (Risks and benefits discussed including risk of aspiration, recall and dental damage. All patient questions answered.    Will continue with plan of care.)  intravenous induction     Anesthetic plan, risks, benefits, and alternatives have been provided, discussed and informed consent has been obtained with: patient.  Pre-procedure education provided    CODE STATUS:

## 2025-06-16 ENCOUNTER — ANESTHESIA (OUTPATIENT)
Dept: GASTROENTEROLOGY | Facility: HOSPITAL | Age: 70
End: 2025-06-16
Payer: MEDICARE

## 2025-06-16 ENCOUNTER — HOSPITAL ENCOUNTER (OUTPATIENT)
Facility: HOSPITAL | Age: 70
Setting detail: HOSPITAL OUTPATIENT SURGERY
Discharge: HOME | End: 2025-06-16
Attending: SURGERY | Admitting: SURGERY
Payer: MEDICARE

## 2025-06-16 VITALS
BODY MASS INDEX: 29.23 KG/M2 | SYSTOLIC BLOOD PRESSURE: 139 MMHG | HEART RATE: 67 BPM | OXYGEN SATURATION: 98 % | TEMPERATURE: 97 F | RESPIRATION RATE: 18 BRPM | DIASTOLIC BLOOD PRESSURE: 71 MMHG | WEIGHT: 165 LBS | HEIGHT: 63 IN

## 2025-06-16 DIAGNOSIS — R13.11 ORAL PHASE DYSPHAGIA: ICD-10-CM

## 2025-06-16 DIAGNOSIS — K21.9 GASTROESOPHAGEAL REFLUX DISEASE, UNSPECIFIED WHETHER ESOPHAGITIS PRESENT: ICD-10-CM

## 2025-06-16 PROCEDURE — C1726 CATH, BAL DIL, NON-VASCULAR: HCPCS | Performed by: SURGERY

## 2025-06-16 PROCEDURE — 25010000002 LIDOCAINE (CARDIAC): Performed by: NURSE ANESTHETIST, CERTIFIED REGISTERED

## 2025-06-16 PROCEDURE — 25010000002 PROPOFOL 10 MG/ML EMULSION: Performed by: NURSE ANESTHETIST, CERTIFIED REGISTERED

## 2025-06-16 PROCEDURE — 25010000002 ONDANSETRON PER 1 MG: Performed by: NURSE ANESTHETIST, CERTIFIED REGISTERED

## 2025-06-16 PROCEDURE — 43249 ESOPH EGD DILATION <30 MM: CPT | Performed by: SURGERY

## 2025-06-16 PROCEDURE — 25810000003 LACTATED RINGERS PER 1000 ML: Performed by: SURGERY

## 2025-06-16 RX ORDER — ONDANSETRON 2 MG/ML
4 INJECTION INTRAMUSCULAR; INTRAVENOUS ONCE AS NEEDED
Status: DISCONTINUED | OUTPATIENT
Start: 2025-06-16 | End: 2025-06-16 | Stop reason: HOSPADM

## 2025-06-16 RX ORDER — SODIUM CHLORIDE 0.9 % (FLUSH) 0.9 %
10 SYRINGE (ML) INJECTION AS NEEDED
Status: DISCONTINUED | OUTPATIENT
Start: 2025-06-16 | End: 2025-06-16 | Stop reason: HOSPADM

## 2025-06-16 RX ORDER — ONDANSETRON 2 MG/ML
INJECTION INTRAMUSCULAR; INTRAVENOUS AS NEEDED
Status: DISCONTINUED | OUTPATIENT
Start: 2025-06-16 | End: 2025-06-16 | Stop reason: SURG

## 2025-06-16 RX ORDER — SODIUM CHLORIDE, SODIUM LACTATE, POTASSIUM CHLORIDE, CALCIUM CHLORIDE 600; 310; 30; 20 MG/100ML; MG/100ML; MG/100ML; MG/100ML
50 INJECTION, SOLUTION INTRAVENOUS CONTINUOUS
Status: DISCONTINUED | OUTPATIENT
Start: 2025-06-16 | End: 2025-06-16 | Stop reason: HOSPADM

## 2025-06-16 RX ORDER — PROPOFOL 10 MG/ML
VIAL (ML) INTRAVENOUS AS NEEDED
Status: DISCONTINUED | OUTPATIENT
Start: 2025-06-16 | End: 2025-06-16 | Stop reason: SURG

## 2025-06-16 RX ADMIN — SODIUM CHLORIDE, POTASSIUM CHLORIDE, SODIUM LACTATE AND CALCIUM CHLORIDE 50 ML/HR: 600; 310; 30; 20 INJECTION, SOLUTION INTRAVENOUS at 10:48

## 2025-06-16 RX ADMIN — PROPOFOL 100 MG: 10 INJECTION, EMULSION INTRAVENOUS at 11:16

## 2025-06-16 RX ADMIN — ONDANSETRON 4 MG: 2 INJECTION INTRAMUSCULAR; INTRAVENOUS at 11:25

## 2025-06-16 RX ADMIN — LIDOCAINE HYDROCHLORIDE 50 MG: 20 INJECTION, SOLUTION INTRAVENOUS at 11:16

## 2025-06-16 RX ADMIN — PROPOFOL 50 MG: 10 INJECTION, EMULSION INTRAVENOUS at 11:20

## 2025-06-16 NOTE — ANESTHESIA POSTPROCEDURE EVALUATION
Patient: Lorrie Plasencia    Procedure Summary       Date: 06/16/25 Room / Location: [facility] ENDOSCOPY 3 / [facility] ENDOSCOPY    Anesthesia Start: 1112 Anesthesia Stop: 1128    Procedure: ESOPHAGOGASTRODUODENOSCOPY WITH DILITATION AND BIOPSY (Esophagus) Diagnosis:       Oral phase dysphagia      Gastroesophageal reflux disease, unspecified whether esophagitis present      (Oral phase dysphagia [R13.11])      (Gastroesophageal reflux disease, unspecified whether esophagitis present [K21.9])    Surgeons: David Louise MD Provider: Lynne Joy CRNA    Anesthesia Type: MAC ASA Status: 3            Anesthesia Type: MAC    Vitals  Vitals Value Taken Time   /71 06/16/25 11:57   Temp 97 °F (36.1 °C) 06/16/25 11:57   Pulse 67 06/16/25 11:57   Resp 18 06/16/25 11:57   SpO2 98 % 06/16/25 11:57           Post Anesthesia Care and Evaluation    Patient location during evaluation: PHASE II  Patient participation: complete - patient participated  Level of consciousness: awake and alert  Pain score: 0  Pain management: satisfactory to patient    Airway patency: patent  Anesthetic complications: No anesthetic complications  PONV Status: none  Cardiovascular status: acceptable and stable  Respiratory status: acceptable  Hydration status: acceptable    Comments: Vitals signs as noted in nursing documentation as per protocol.

## 2025-06-18 LAB — REF LAB TEST METHOD: NORMAL

## 2025-06-26 NOTE — PROGRESS NOTES
"Patient: Lorrie Plasencia    YOB: 1955    Date: 06/30/2025    Primary Care Provider: Kelly Pepe APRN    Chief Complaint   Patient presents with    Follow-up     EGD       History of present illness:  I saw the patient in the office today as a followup from their recent Esophagogastroduodenoscopy, flexible, transoral; with transendoscopic balloon dilation of esophagus .  Pathology was reviewed and indicated Reactive gastropathy.   They state that they have done well post-operatively and are having no complaints.  Patient swallowing much improved.    Vital Signs:   Vitals:    06/30/25 1248   BP: 148/72   Pulse: 79   Temp: 97.5 °F (36.4 °C)   TempSrc: Infrared   SpO2: 96%   Weight: 74.8 kg (165 lb)   Height: 160 cm (62.99\")       Physical Exam:    Abdomen-soft, nondistended and nontender     Assessment / Plan :    1. Oral phase dysphagia    2. Gastroesophageal reflux disease with esophagitis without hemorrhage      Maintain a GI soft diet, continue PPI medication.  Follow-up as needed    Electronically signed by David Louise MD  06/30/25                  "

## 2025-06-30 ENCOUNTER — OFFICE VISIT (OUTPATIENT)
Dept: SURGERY | Facility: CLINIC | Age: 70
End: 2025-06-30
Payer: MEDICARE

## 2025-06-30 VITALS
BODY MASS INDEX: 29.23 KG/M2 | WEIGHT: 165 LBS | SYSTOLIC BLOOD PRESSURE: 148 MMHG | HEIGHT: 63 IN | OXYGEN SATURATION: 96 % | DIASTOLIC BLOOD PRESSURE: 72 MMHG | HEART RATE: 79 BPM | TEMPERATURE: 97.5 F

## 2025-06-30 DIAGNOSIS — K21.00 GASTROESOPHAGEAL REFLUX DISEASE WITH ESOPHAGITIS WITHOUT HEMORRHAGE: ICD-10-CM

## 2025-06-30 DIAGNOSIS — R13.11 ORAL PHASE DYSPHAGIA: Primary | ICD-10-CM

## 2025-06-30 PROCEDURE — 1160F RVW MEDS BY RX/DR IN RCRD: CPT | Performed by: SURGERY

## 2025-06-30 PROCEDURE — 99212 OFFICE O/P EST SF 10 MIN: CPT | Performed by: SURGERY

## 2025-06-30 PROCEDURE — 1159F MED LIST DOCD IN RCRD: CPT | Performed by: SURGERY

## 2025-08-19 ENCOUNTER — HOSPITAL ENCOUNTER (OUTPATIENT)
Dept: ULTRASOUND IMAGING | Facility: HOSPITAL | Age: 70
Discharge: HOME | End: 2025-08-19
Admitting: UROLOGY
Payer: MEDICARE

## 2025-08-19 DIAGNOSIS — N28.1 RENAL CYST: ICD-10-CM

## 2025-08-19 PROCEDURE — 76775 US EXAM ABDO BACK WALL LIM: CPT

## 2025-08-27 ENCOUNTER — HOSPITAL ENCOUNTER (OUTPATIENT)
Facility: HOSPITAL | Age: 70
Discharge: HOME | End: 2025-08-27
Payer: MEDICARE

## 2025-08-27 ENCOUNTER — OFFICE VISIT (OUTPATIENT)
Age: 70
End: 2025-08-27
Payer: MEDICARE

## 2025-08-27 VITALS
OXYGEN SATURATION: 97 % | BODY MASS INDEX: 29.21 KG/M2 | TEMPERATURE: 97 F | SYSTOLIC BLOOD PRESSURE: 170 MMHG | DIASTOLIC BLOOD PRESSURE: 74 MMHG | RESPIRATION RATE: 18 BRPM | WEIGHT: 164.9 LBS | HEART RATE: 65 BPM

## 2025-08-27 DIAGNOSIS — B88.0 CHIGGERS: ICD-10-CM
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
## 2025-08-27 DIAGNOSIS — S70.362A TICK BITE OF LEFT THIGH, INITIAL ENCOUNTER: ICD-10-CM
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
## 2025-08-27 DIAGNOSIS — S70.362A TICK BITE OF LEFT THIGH, INITIAL ENCOUNTER: Primary | ICD-10-CM

## 2025-08-27 DIAGNOSIS — E53.8 B12 DEFICIENCY: ICD-10-CM

## 2025-08-27 PROCEDURE — 36415 COLL VENOUS BLD VENIPUNCTURE: CPT

## 2025-08-27 PROCEDURE — G8399 PT W/DXA RESULTS DOCUMENT: HCPCS | Performed by: NURSE PRACTITIONER

## 2025-08-27 PROCEDURE — 99213 OFFICE O/P EST LOW 20 MIN: CPT | Performed by: NURSE PRACTITIONER

## 2025-08-27 PROCEDURE — 96372 THER/PROPH/DIAG INJ SC/IM: CPT | Performed by: NURSE PRACTITIONER

## 2025-08-27 PROCEDURE — 3017F COLORECTAL CA SCREEN DOC REV: CPT | Performed by: NURSE PRACTITIONER

## 2025-08-27 PROCEDURE — 1036F TOBACCO NON-USER: CPT | Performed by: NURSE PRACTITIONER

## 2025-08-27 PROCEDURE — 1123F ACP DISCUSS/DSCN MKR DOCD: CPT | Performed by: NURSE PRACTITIONER

## 2025-08-27 PROCEDURE — 86757 RICKETTSIA ANTIBODY: CPT

## 2025-08-27 PROCEDURE — 86618 LYME DISEASE ANTIBODY: CPT

## 2025-08-27 PROCEDURE — G8427 DOCREV CUR MEDS BY ELIG CLIN: HCPCS | Performed by: NURSE PRACTITIONER

## 2025-08-27 PROCEDURE — G8419 CALC BMI OUT NRM PARAM NOF/U: HCPCS | Performed by: NURSE PRACTITIONER

## 2025-08-27 PROCEDURE — 1159F MED LIST DOCD IN RCRD: CPT | Performed by: NURSE PRACTITIONER

## 2025-08-27 PROCEDURE — 1090F PRES/ABSN URINE INCON ASSESS: CPT | Performed by: NURSE PRACTITIONER

## 2025-08-27 RX ORDER — BETAMETHASONE VALERATE 1.2 MG/G
CREAM TOPICAL
Qty: 45 G | Refills: 0 | Status: SHIPPED | OUTPATIENT
Start: 2025-08-27 | End: 2025-09-03

## 2025-08-27 RX ORDER — METHYLPREDNISOLONE ACETATE 80 MG/ML
120 INJECTION, SUSPENSION INTRA-ARTICULAR; INTRALESIONAL; INTRAMUSCULAR; SOFT TISSUE ONCE
Status: COMPLETED | OUTPATIENT
Start: 2025-08-27 | End: 2025-08-27

## 2025-08-27 RX ORDER — CYANOCOBALAMIN 1000 UG/ML
1000 INJECTION, SOLUTION INTRAMUSCULAR; SUBCUTANEOUS ONCE
Status: COMPLETED | OUTPATIENT
Start: 2025-08-27 | End: 2025-08-27

## 2025-08-27 RX ORDER — HYDROXYZINE PAMOATE 25 MG/1
25 CAPSULE ORAL 3 TIMES DAILY PRN
Qty: 30 CAPSULE | Refills: 0 | Status: SHIPPED | OUTPATIENT
Start: 2025-08-27 | End: 2025-09-06

## 2025-08-27 RX ADMIN — CYANOCOBALAMIN 1000 MCG: 1000 INJECTION, SOLUTION INTRAMUSCULAR; SUBCUTANEOUS at 09:42

## 2025-08-27 RX ADMIN — METHYLPREDNISOLONE ACETATE 120 MG: 80 INJECTION, SUSPENSION INTRA-ARTICULAR; INTRALESIONAL; INTRAMUSCULAR; SOFT TISSUE at 09:41

## 2025-08-27 ASSESSMENT — ENCOUNTER SYMPTOMS
ALLERGIC/IMMUNOLOGIC NEGATIVE: 1
SHORTNESS OF BREATH: 0
NAUSEA: 0
DIARRHEA: 0
VOMITING: 0
COUGH: 0
ABDOMINAL PAIN: 0
EYES NEGATIVE: 1

## 2025-08-31 LAB
R RICKETTSI IGG TITR SER IF: NORMAL {TITER}
R RICKETTSI IGM TITR SER IF: NORMAL {TITER}

## 2025-09-02 LAB
B BURGDOR IGG SERPL QL IA: 0.31 IV
B BURGDOR IGG+IGM SER IA-IMP: NEGATIVE
B BURGDOR IGM SERPL QL IA: 0.35 IV
B BURGDOR.VLSE1+PEPC10 AB SER IA-ACNC: 1.07 IV

## (undated) DEVICE — CATHETER,URETHRAL,REDRUBBER,STRL,18FR: Brand: MEDLINE

## (undated) DEVICE — PATIENT RETURN ELECTRODE, SINGLE-USE, CONTACT QUALITY MONITORING, ADULT, WITH 9FT CORD, FOR PATIENTS WEIGING OVER 33LBS. (15KG): Brand: MEGADYNE

## (undated) DEVICE — GLV SURG SENSICARE LT W/ALOE PF LF 7 STRL

## (undated) DEVICE — ADAPT/Y SCPE GATEWAY ADVNTGE

## (undated) DEVICE — ADAPT UROLOK

## (undated) DEVICE — GW AMPLTZ SUPRSTF PTFE JB .035 7X145CM

## (undated) DEVICE — RICH CYSTO: Brand: MEDLINE INDUSTRIES, INC.

## (undated) DEVICE — NITINOL WIRE WITH HYDROPHILIC TIP: Brand: SENSOR

## (undated) DEVICE — LUBE JELLY PK/2.75GM STRL BX/144

## (undated) DEVICE — SLV SCD CALF HEMOFORCE DVT THERP REPROC MD

## (undated) DEVICE — ST FLD IRR WARM

## (undated) DEVICE — ESOPHAGEAL BALLOON DILATATION CATHETER: Brand: CRE FIXED WIRE

## (undated) DEVICE — SOL IRR NACL 0.9PCT 3000ML

## (undated) DEVICE — FIBR LASR MOSES 200 DFL 2J 80HZ

## (undated) DEVICE — CANISTER, RIGID, 2000CC: Brand: MEDLINE INDUSTRIES, INC.

## (undated) DEVICE — DEV INFL ALLIANCE2 SYS

## (undated) DEVICE — ENDOSCOPY PORT CONNECTOR FOR OLYMPUS® SCOPES: Brand: ERBE

## (undated) DEVICE — SYR LUER SLPTP 50ML

## (undated) DEVICE — FRCP BX RADJAW4 NDL 2.8 240 STD OG

## (undated) DEVICE — Device

## (undated) DEVICE — URETERAL ACCESS SHEATH SET: Brand: NAVIGATOR HD

## (undated) DEVICE — ST URO THERMEDX/FLUIDSMART IRR/FLD/WARM PNT/PRESS/300MMHG

## (undated) DEVICE — CATH URETRL FLXITP POLLACK STD 5F 70CM

## (undated) DEVICE — CONMED SCOPE SAVER BITE BLOCK, 20X27 MM: Brand: SCOPE SAVER

## (undated) DEVICE — HYBRID CO2 TUBING/CAP SET FOR OLYMPUS® SCOPES & CO2 SOURCE: Brand: ERBE